# Patient Record
Sex: FEMALE | Race: WHITE | NOT HISPANIC OR LATINO | Employment: OTHER | URBAN - METROPOLITAN AREA
[De-identification: names, ages, dates, MRNs, and addresses within clinical notes are randomized per-mention and may not be internally consistent; named-entity substitution may affect disease eponyms.]

---

## 2023-02-23 LAB
ANION GAP IN SER/PLAS: 8 MMOL/L (ref 10–20)
CALCIUM (MG/DL) IN SER/PLAS: 9.6 MG/DL (ref 8.6–10.3)
CARBON DIOXIDE, TOTAL (MMOL/L) IN SER/PLAS: 26 MMOL/L (ref 21–32)
CHLORIDE (MMOL/L) IN SER/PLAS: 112 MMOL/L (ref 98–107)
CREATININE (MG/DL) IN SER/PLAS: 0.8 MG/DL (ref 0.5–1.05)
GFR FEMALE: 75 ML/MIN/1.73M2
GLUCOSE (MG/DL) IN SER/PLAS: 99 MG/DL (ref 74–99)
POTASSIUM (MMOL/L) IN SER/PLAS: 4.2 MMOL/L (ref 3.5–5.3)
SODIUM (MMOL/L) IN SER/PLAS: 142 MMOL/L (ref 136–145)
UREA NITROGEN (MG/DL) IN SER/PLAS: 18 MG/DL (ref 6–23)

## 2023-03-06 DIAGNOSIS — I50.22 CHRONIC SYSTOLIC HEART FAILURE (MULTI): Primary | ICD-10-CM

## 2023-03-14 ENCOUNTER — TELEMEDICINE (OUTPATIENT)
Dept: PHARMACY | Facility: HOSPITAL | Age: 78
End: 2023-03-14
Payer: MEDICARE

## 2023-03-14 DIAGNOSIS — I50.22 CHRONIC SYSTOLIC HEART FAILURE (MULTI): ICD-10-CM

## 2023-03-14 RX ORDER — ISOSORBIDE MONONITRATE 30 MG/1
30 TABLET, EXTENDED RELEASE ORAL DAILY
COMMUNITY
End: 2023-05-16 | Stop reason: ALTCHOICE

## 2023-03-14 RX ORDER — CITALOPRAM 20 MG/1
1 TABLET, FILM COATED ORAL DAILY
COMMUNITY
End: 2024-04-15 | Stop reason: WASHOUT

## 2023-03-14 RX ORDER — FERROUS SULFATE 325(65) MG
65 TABLET ORAL DAILY
COMMUNITY
End: 2023-11-28 | Stop reason: ALTCHOICE

## 2023-03-14 RX ORDER — PAROXETINE 30 MG/1
1 TABLET, FILM COATED ORAL DAILY
COMMUNITY
End: 2023-11-28 | Stop reason: ALTCHOICE

## 2023-03-14 RX ORDER — OMEPRAZOLE 40 MG/1
40 CAPSULE, DELAYED RELEASE ORAL 2 TIMES DAILY
COMMUNITY
End: 2023-07-06 | Stop reason: SDUPTHER

## 2023-03-14 RX ORDER — PREDNISONE 20 MG/1
20 TABLET ORAL
COMMUNITY
Start: 2023-01-19 | End: 2023-05-16 | Stop reason: ALTCHOICE

## 2023-03-14 RX ORDER — SACUBITRIL AND VALSARTAN 24; 26 MG/1; MG/1
1 TABLET, FILM COATED ORAL 2 TIMES DAILY
COMMUNITY
End: 2024-04-22

## 2023-03-14 RX ORDER — ASPIRIN 81 MG/1
1 TABLET ORAL DAILY
COMMUNITY
Start: 2019-08-14

## 2023-03-14 RX ORDER — DAPAGLIFLOZIN 10 MG/1
1 TABLET, FILM COATED ORAL DAILY
COMMUNITY
Start: 2023-02-24 | End: 2023-10-24

## 2023-03-14 RX ORDER — IPRATROPIUM BROMIDE 42 UG/1
2 SPRAY, METERED NASAL 2 TIMES DAILY
COMMUNITY
End: 2023-11-28 | Stop reason: ALTCHOICE

## 2023-03-14 RX ORDER — EPINEPHRINE 0.3 MG/.3ML
1 INJECTION SUBCUTANEOUS AS NEEDED
COMMUNITY

## 2023-03-14 RX ORDER — ROSUVASTATIN CALCIUM 20 MG/1
20 TABLET, COATED ORAL DAILY
COMMUNITY

## 2023-03-14 RX ORDER — SPIRONOLACTONE 25 MG/1
1 TABLET ORAL DAILY
COMMUNITY
Start: 2023-02-23

## 2023-03-14 RX ORDER — LORATADINE 10 MG/1
1 TABLET ORAL DAILY
COMMUNITY
Start: 2019-08-27 | End: 2023-05-16 | Stop reason: ALTCHOICE

## 2023-03-14 RX ORDER — METOPROLOL SUCCINATE 25 MG/1
25 TABLET, EXTENDED RELEASE ORAL DAILY
COMMUNITY

## 2023-03-14 RX ORDER — MONTELUKAST SODIUM 10 MG/1
10 TABLET ORAL DAILY
COMMUNITY
End: 2023-05-16 | Stop reason: ALTCHOICE

## 2023-03-14 RX ORDER — NITROGLYCERIN 0.4 MG/1
0.4 TABLET SUBLINGUAL EVERY 5 MIN PRN
COMMUNITY
Start: 2020-06-16 | End: 2024-01-15 | Stop reason: SDUPTHER

## 2023-03-14 RX ORDER — DONEPEZIL HYDROCHLORIDE 10 MG/1
10 TABLET, FILM COATED ORAL NIGHTLY
COMMUNITY
End: 2023-05-16 | Stop reason: ENTERED-IN-ERROR

## 2023-03-14 RX ORDER — BUSPIRONE HYDROCHLORIDE 10 MG/1
10 TABLET ORAL 2 TIMES DAILY
COMMUNITY
End: 2023-11-28 | Stop reason: ALTCHOICE

## 2023-03-14 RX ORDER — MEMANTINE HYDROCHLORIDE 5 MG/1
5 TABLET ORAL NIGHTLY
COMMUNITY
End: 2023-05-16

## 2023-03-14 RX ORDER — LAMOTRIGINE 25 MG/1
25 TABLET ORAL 2 TIMES DAILY
COMMUNITY
End: 2023-05-16 | Stop reason: ALTCHOICE

## 2023-03-14 RX ORDER — COLCHICINE 0.6 MG/1
0.6 CAPSULE ORAL DAILY PRN
COMMUNITY
End: 2023-05-16 | Stop reason: ALTCHOICE

## 2023-03-14 RX ORDER — OXYBUTYNIN CHLORIDE 15 MG/1
1 TABLET, EXTENDED RELEASE ORAL 2 TIMES DAILY
COMMUNITY
Start: 2023-01-17 | End: 2023-11-28 | Stop reason: ALTCHOICE

## 2023-03-14 ASSESSMENT — ENCOUNTER SYMPTOMS
EDEMA: 0
PALPITATIONS: 0
SHORTNESS OF BREATH: 0
FATIGUE: 0

## 2023-03-14 NOTE — PROGRESS NOTES
Pharmacy Post-Discharge Follow Up Visit  Gianna Parker is a 77 y.o. female was referred to Clinical Pharmacy Team to complete a post-discharge medication optimization and monitoring visit.  The patient was referred for their Congestive Heart Failure.      Referring Provider: Nando Lombardi MD    Subjective   Allergies   Allergen Reactions    Bee Venom Protein (Honey Bee) Anaphylaxis and Unknown    Fish Containing Products Unknown    Shellfish Containing Products Unknown    Nitrofurantoin Rash     burning    Penicillins Itching, Rash and Unknown     When Young; tolerated ceftriaxone Dec 2022    Sulfa (Sulfonamide Antibiotics) Unknown and Rash     No Pharmacies Listed    Social History     Social History Narrative    Not on file        Congestive Heart Failure  Presents for follow-up visit. Pertinent negatives include no chest pain, edema, fatigue, palpitations or shortness of breath (on exertion). Compliance with total regimen is %. Compliance with medications is %.       Review of Systems   Constitutional:  Negative for fatigue.   Respiratory:  Negative for shortness of breath (on exertion).    Cardiovascular:  Negative for chest pain and palpitations.       Objective     There were no vitals taken for this visit.     LAB  Lab Results   Component Value Date    BILITOT 0.8 01/12/2023    CALCIUM 9.6 02/23/2023    CO2 26 02/23/2023     (H) 02/23/2023    CREATININE 0.80 02/23/2023    GLUCOSE 99 02/23/2023    ALKPHOS 100 01/12/2023    K 4.2 02/23/2023    PROT 7.6 01/12/2023     02/23/2023    AST 13 01/12/2023    ALT 12 01/12/2023    BUN 18 02/23/2023    ANIONGAP 8 (L) 02/23/2023    MG 1.86 12/28/2022    PHOS 1.8 (L) 12/29/2022    ALBUMIN 4.2 01/12/2023    GFRF 75 02/23/2023     Lab Results   Component Value Date    TRIG 161 (H) 12/08/2020    CHOL 106 12/08/2020    HDL 33.0 (A) 12/08/2020     Lab Results   Component Value Date    HGBA1C 6.6 12/20/2019         Current Outpatient Medications  on File Prior to Visit   Medication Sig Dispense Refill    albuterol 90 mcg/actuation aerosol powdr breath activated inhaler Inhale 2 puffs every 4 hours if needed.      aspirin 81 mg EC tablet Take 1 tablet (81 mg) by mouth once daily.      Farxiga 10 mg Take 1 tablet (10 mg) by mouth once daily.      loratadine (Claritin) 10 mg tablet Take 1 tablet (10 mg) by mouth once daily.      nitroglycerin (Nitrostat) 0.4 mg SL tablet Place 1 tablet (0.4 mg) under the tongue every 5 minutes if needed.      oxybutynin XL (Ditropan-XL) 10 mg 24 hr tablet Take 1 tablet (10 mg) by mouth in the morning and 1 tablet (10 mg) before bedtime.      predniSONE (Deltasone) 20 mg tablet Take 1 tablet (20 mg) by mouth. 3 tablets daily for 3 days, then 2 tablets daily for 3 days, then 1 tablet daily for 3 days      spironolactone (Aldactone) 25 mg tablet Take 0.5 tablets (12.5 mg) by mouth once daily.      busPIRone (Buspar) 10 mg tablet Take 1 tablet (10 mg) by mouth in the morning and 1 tablet (10 mg) before bedtime.      citalopram (CeleXA) 20 mg tablet Take 1 tablet (20 mg) by mouth once daily.      colchicine (Mitigare) 0.6 mg capsule capsule Take 1 capsule (0.6 mg) by mouth once daily as needed.      donepezil (Aricept) 10 mg tablet Take 1 tablet (10 mg) by mouth once daily at bedtime.      Entresto 24-26 mg tablet Take 1 tablet by mouth in the morning and 1 tablet before bedtime.      EPINEPHrine 0.3 mg/0.3 mL injection syringe Inject 0.3 mL (0.3 mg) as directed if needed.      ferrous sulfate 325 (65 Fe) MG tablet Take 1 tablet (65 mg of iron) by mouth once daily.      ipratropium (Atrovent) 42 mcg (0.06 %) nasal spray Administer 2 sprays into each nostril in the morning and 2 sprays before bedtime.      isosorbide mononitrate ER (Imdur) 30 mg 24 hr tablet Take 1 tablet (30 mg) by mouth once daily.      lamoTRIgine (LaMICtal) 25 mg tablet Take 1 tablet (25 mg) by mouth in the morning and 1 tablet (25 mg) in the evening.       memantine (Namenda) 5 mg tablet Take 1 tablet (5 mg) by mouth once daily at bedtime.      metoprolol succinate XL (Toprol-XL) 25 mg 24 hr tablet Take 1 tablet (25 mg) by mouth once daily.      montelukast (Singulair) 10 mg tablet Take 1 tablet (10 mg) by mouth once daily.      omeprazole (PriLOSEC) 40 mg DR capsule Take 1 capsule (40 mg) by mouth in the morning and 1 capsule (40 mg) before bedtime.      PARoxetine (Paxil) 30 mg tablet Take 1 tablet (30 mg) by mouth once daily.      rosuvastatin (Crestor) 20 mg tablet Take 1 tablet (20 mg) by mouth once daily.       No current facility-administered medications on file prior to visit.      CHRONIC HEART FAILURE/ CAD with stent MEDICATION ASSESSMENT    CURRENT PHARMACOTHERAPY  - Entresto 24-26m tablet by mouth twice daily (expensive)  - Metoprolol succinate ER 25m tablet by mouth daily   - Aspirin 81m tablet by mouth daily   - Isosorbide Mononitrate ER 30m tablet by mouth daily  - Nitroglycerin 0.4mg SL: 1 tablet as needed   -Spironolactone 25mg : 0.5 tablet daily     Ejection Fraction [2022]: [35-40] %    Guideline-Directed Medical Therapy:  -ARNI: Yes, describe: [id number] tablet twice daily    -If no, then ACEi/ARB?: No  -Beta Blocker: Yes, describe: Metoprolol succinate XL 25mg tablet daily   -MRA: Yes, describe: Spironolactone 25mg tablet: 0.5 tab daily   -SGLT2i: No    Secondary Prevention:  -The ASCVD Risk score (Jose DK, et al., 2019) failed to calculate for the following reasons:    The valid total cholesterol range is 130 to 320 mg/dL    The smoking status is invalid   -Aspirin 81mg? yes  -Statin?: Yes, describe: Rosuvastatin 20mg daily  -HTN?: Yes, describe: Entresto and Spironolactone       HISTORICAL PHARMACOTHERAPY  - Brilinta 90m tablet by mouth twice a day (discontinued  expensive)    DRUG INTERATIONS  - None    PATIENT EDUCATION/DISCUSSION:  -Patient was initiated on Spironolactone 25mg 0.5 tab daily 2 weeks ago.  Patient states tolerating well. Denies symptoms of hypo/hypertension. Patient denies swelling in the arms/legs, chest pain, and only has SOB on exertion. Recommend patient to start logging blood pressures at home with her cuff, so she can bring to her cardio appointments 2 weeks ago. Patient states she has not been recording BP log. Patient states her  checks his every morning, so suggested to start taking it in the morning together.   - Depending on blood pressure readings at next visit, consider increasing Spironolactone 25mg to 1 tablet daily. Patient in agreement.  -Last visit, I ran test claim for Farxgia ($15 copay with  Pharmacy). Jardiance had $113 copay. I tasked cardiologist with  SGLT2 recommendation.      Assessment/Plan   Problem List Items Addressed This Visit    None  Visit Diagnoses       Chronic systolic heart failure (CMS/HCC)        Relevant Medications    metoprolol succinate XL (Toprol-XL) 25 mg 24 hr tablet    nitroglycerin (Nitrostat) 0.4 mg SL tablet    Entresto 24-26 mg tablet    EPINEPHrine 0.3 mg/0.3 mL injection syringe    isosorbide mononitrate ER (Imdur) 30 mg 24 hr tablet        Continue Spironolactone 25mg 0.5 tablet by mouth daily  Continue all other medications as prescribed   Will follow up in 4-6 weeks or as needed    Shirley Wong PharmD.   Meds PGY1 Pharmacy Resident  Phone: [phone]      Verbal consent to manage patient's drug therapy was obtained from the patient. They were informed they may decline to participate or withdraw from participation in pharmacy services at any time.

## 2023-03-16 NOTE — PROGRESS NOTES
I reviewed the progress note and agree with the resident’s findings and plans as written. Case discussed with resident.    Serge Crane, PharmD

## 2023-05-01 ENCOUNTER — TELEPHONE (OUTPATIENT)
Dept: PHARMACY | Facility: HOSPITAL | Age: 78
End: 2023-05-01
Payer: MEDICARE

## 2023-05-01 DIAGNOSIS — I50.22 CHRONIC SYSTOLIC HEART FAILURE (MULTI): Primary | ICD-10-CM

## 2023-05-01 NOTE — TELEPHONE ENCOUNTER
[facility]  Patient was referred to the Clinical Pharmacy Team for CHF/CAD stent  Patient returned phone call - scheduled appointment for 5/1/23

## 2023-05-16 ENCOUNTER — OFFICE VISIT (OUTPATIENT)
Dept: PRIMARY CARE | Facility: CLINIC | Age: 78
End: 2023-05-16
Payer: MEDICARE

## 2023-05-16 VITALS
BODY MASS INDEX: 24.32 KG/M2 | OXYGEN SATURATION: 96 % | DIASTOLIC BLOOD PRESSURE: 64 MMHG | RESPIRATION RATE: 14 BRPM | HEIGHT: 65 IN | WEIGHT: 146 LBS | HEART RATE: 91 BPM | SYSTOLIC BLOOD PRESSURE: 102 MMHG

## 2023-05-16 DIAGNOSIS — I50.20 HFREF (HEART FAILURE WITH REDUCED EJECTION FRACTION) (MULTI): Primary | ICD-10-CM

## 2023-05-16 DIAGNOSIS — I25.119 CORONARY ARTERY DISEASE INVOLVING NATIVE CORONARY ARTERY OF NATIVE HEART WITH ANGINA PECTORIS (CMS-HCC): ICD-10-CM

## 2023-05-16 DIAGNOSIS — G31.84 MCI (MILD COGNITIVE IMPAIRMENT): Primary | ICD-10-CM

## 2023-05-16 DIAGNOSIS — N90.89 BURNING SENSATION OF FEMALE PERINEUM: ICD-10-CM

## 2023-05-16 DIAGNOSIS — F32.A DEPRESSION, UNSPECIFIED DEPRESSION TYPE: ICD-10-CM

## 2023-05-16 PROCEDURE — 1157F ADVNC CARE PLAN IN RCRD: CPT | Performed by: INTERNAL MEDICINE

## 2023-05-16 PROCEDURE — 1159F MED LIST DOCD IN RCRD: CPT | Performed by: INTERNAL MEDICINE

## 2023-05-16 PROCEDURE — 1036F TOBACCO NON-USER: CPT | Performed by: INTERNAL MEDICINE

## 2023-05-16 PROCEDURE — 99213 OFFICE O/P EST LOW 20 MIN: CPT | Performed by: INTERNAL MEDICINE

## 2023-05-16 RX ORDER — MEMANTINE HYDROCHLORIDE 5 MG/1
TABLET ORAL
Qty: 90 TABLET | Refills: 1 | Status: SHIPPED | OUTPATIENT
Start: 2023-05-16 | End: 2023-05-16 | Stop reason: ENTERED-IN-ERROR

## 2023-05-16 ASSESSMENT — ENCOUNTER SYMPTOMS
DIARRHEA: 0
FEVER: 0
JOINT SWELLING: 0
COUGH: 0
DIAPHORESIS: 0
SHORTNESS OF BREATH: 0
NAUSEA: 0
CONSTIPATION: 0
CHILLS: 0
MYALGIAS: 0
VOMITING: 0

## 2023-05-16 ASSESSMENT — PATIENT HEALTH QUESTIONNAIRE - PHQ9
1. LITTLE INTEREST OR PLEASURE IN DOING THINGS: NOT AT ALL
2. FEELING DOWN, DEPRESSED OR HOPELESS: NOT AT ALL
SUM OF ALL RESPONSES TO PHQ9 QUESTIONS 1 AND 2: 0

## 2023-05-16 NOTE — PROGRESS NOTES
"Subjective   Gianna Parker is a 77 y.o. female who presents for FOLLOW UP    HAVING A DEVICE PLACED TO STIMULATE BP     HPI   WAS IN HOSPITAL 12/23 BULGE NEAR PAROTID    WAS TOLD FROM DRY MOUTH AND A STONE?    WAS GIVEN STEROIDS AND PAIN MEDS, AND ABX    GOT BETTER.    DR. BEAR, DR. WILKINS, AND EP DOCTOR    PLANNING DEVICE PLACEMENT FOR CARDIAC CONTRACTILITY MODULATION?    HAPPENING IN 4-6 WEEKS    HAVING A PERINEAL ITCH/BURN. TRIED VASELINE/CREAMS.      Review of Systems   Constitutional:  Negative for chills, diaphoresis and fever.   Respiratory:  Negative for cough and shortness of breath.    Cardiovascular:  Negative for chest pain and leg swelling.   Gastrointestinal:  Negative for constipation, diarrhea, nausea and vomiting.   Musculoskeletal:  Negative for joint swelling and myalgias.       Objective   /64   Pulse 91   Resp 14   Ht 1.651 m (5' 5\")   Wt 66.2 kg (146 lb)   SpO2 96%   BMI 24.30 kg/m²     Physical Exam  Vitals reviewed.   Constitutional:       General: She is not in acute distress.     Appearance: She is not ill-appearing.   Cardiovascular:      Rate and Rhythm: Normal rate and regular rhythm.      Pulses: Normal pulses.      Heart sounds:      No gallop.   Pulmonary:      Breath sounds: Normal breath sounds. No wheezing, rhonchi or rales.   Abdominal:      General: Abdomen is flat. Bowel sounds are normal.      Palpations: Abdomen is soft.      Tenderness: There is no guarding or rebound.   Musculoskeletal:      Right lower leg: No edema.      Left lower leg: No edema.         Assessment/Plan   Problem List Items Addressed This Visit          Nervous    Burning sensation of female perineum       Circulatory    HFrEF (heart failure with reduced ejection fraction) (CMS/HCC) - Primary    Coronary artery disease involving native coronary artery of native heart with angina pectoris (CMS/HCC)       Other    Depression     Patient Instructions    LET CARDIOLOGIST KNOW ABOUT THE " PERINEAL IRRITATION, THIS CAN BE FROM FARXIGA.  YOU MAY USE GYNE-LOTRIMIN OVER THE COUNTER AS NEEDED TO CONTROL THE ITCHING    2.  PLEASE CALL IF REFILLS ARE NEEDED    3.  I WILL AWAIT NEWS ABOUT YOUR DEVICE PLACEMENT    4.  FOLLOW UP 6 MONTHS OR AS NEEDED.

## 2023-05-16 NOTE — PATIENT INSTRUCTIONS
LET CARDIOLOGIST KNOW ABOUT THE PERINEAL IRRITATION, THIS CAN BE FROM FARXIGA.  YOU MAY USE GYNE-LOTRIMIN OVER THE COUNTER AS NEEDED TO CONTROL THE ITCHING    2.  PLEASE CALL IF REFILLS ARE NEEDED    3.  I WILL AWAIT NEWS ABOUT YOUR DEVICE PLACEMENT    4.  FOLLOW UP 6 MONTHS OR AS NEEDED.

## 2023-06-26 ENCOUNTER — TELEPHONE (OUTPATIENT)
Dept: PHARMACY | Facility: HOSPITAL | Age: 78
End: 2023-06-26
Payer: MEDICARE

## 2023-06-26 DIAGNOSIS — J30.9 ALLERGIC RHINITIS, UNSPECIFIED SEASONALITY, UNSPECIFIED TRIGGER: Primary | ICD-10-CM

## 2023-06-26 RX ORDER — LORATADINE 10 MG/1
10 TABLET ORAL DAILY
COMMUNITY
End: 2023-06-26 | Stop reason: SDUPTHER

## 2023-06-26 RX ORDER — LORATADINE 10 MG/1
10 TABLET ORAL DAILY
Qty: 90 TABLET | Refills: 3 | Status: SHIPPED | OUTPATIENT
Start: 2023-06-26 | End: 2023-11-28 | Stop reason: ALTCHOICE

## 2023-06-26 NOTE — TELEPHONE ENCOUNTER
Population Health: Outreach by Ambulatory Pharmacy Team    Payor: Stephanie HAWK  Reason: Adherence  Medication: rosuvastatin 20 mg   Outcome:  patient states she is taking it.     aMriely Rivero, PharmD

## 2023-06-29 DIAGNOSIS — J45.909 ASTHMA, UNSPECIFIED ASTHMA SEVERITY, UNSPECIFIED WHETHER COMPLICATED, UNSPECIFIED WHETHER PERSISTENT (HHS-HCC): Primary | ICD-10-CM

## 2023-07-03 RX ORDER — MONTELUKAST SODIUM 10 MG/1
10 TABLET ORAL DAILY
Qty: 90 TABLET | Refills: 3 | Status: SHIPPED | OUTPATIENT
Start: 2023-07-03 | End: 2024-05-06

## 2023-07-06 DIAGNOSIS — K21.9 GASTROESOPHAGEAL REFLUX DISEASE, UNSPECIFIED WHETHER ESOPHAGITIS PRESENT: Primary | ICD-10-CM

## 2023-07-06 RX ORDER — OMEPRAZOLE 40 MG/1
40 CAPSULE, DELAYED RELEASE ORAL 2 TIMES DAILY
Qty: 180 CAPSULE | Refills: 3 | Status: SHIPPED | OUTPATIENT
Start: 2023-07-06 | End: 2024-05-06

## 2023-08-15 ENCOUNTER — TELEPHONE (OUTPATIENT)
Dept: PRIMARY CARE | Facility: CLINIC | Age: 78
End: 2023-08-15
Payer: MEDICARE

## 2023-08-15 DIAGNOSIS — B37.9 CANDIDIASIS: Primary | ICD-10-CM

## 2023-08-15 RX ORDER — FLUCONAZOLE 150 MG/1
150 TABLET ORAL WEEKLY
Qty: 2 TABLET | Refills: 0 | Status: SHIPPED | OUTPATIENT
Start: 2023-08-15 | End: 2023-08-29

## 2023-09-04 PROBLEM — I83.893 VARICOSE VEINS OF BILATERAL LOWER EXTREMITIES WITH OTHER COMPLICATIONS: Status: ACTIVE | Noted: 2023-09-04

## 2023-09-04 PROBLEM — I10 BENIGN ESSENTIAL HYPERTENSION: Status: ACTIVE | Noted: 2023-09-04

## 2023-09-04 PROBLEM — K21.9 GASTROESOPHAGEAL REFLUX DISEASE: Status: ACTIVE | Noted: 2023-09-04

## 2023-09-04 PROBLEM — R09.89 RIGHT CAROTID BRUIT: Status: ACTIVE | Noted: 2023-09-04

## 2023-09-04 PROBLEM — E78.2 MIXED HYPERLIPIDEMIA: Status: ACTIVE | Noted: 2023-09-04

## 2023-09-04 PROBLEM — K11.20 PAROTIDITIS: Status: ACTIVE | Noted: 2023-09-04

## 2023-09-04 PROBLEM — I50.20 SYSTOLIC HEART FAILURE, ACC/AHA STAGE C (MULTI): Status: ACTIVE | Noted: 2023-09-04

## 2023-09-04 PROBLEM — K86.1 CHRONIC RECURRENT PANCREATITIS (MULTI): Status: ACTIVE | Noted: 2023-09-04

## 2023-09-04 PROBLEM — K83.8 CHOLANGIECTASIS: Status: ACTIVE | Noted: 2023-09-04

## 2023-09-04 PROBLEM — I20.0 UNSTABLE ANGINA (MULTI): Status: ACTIVE | Noted: 2023-09-04

## 2023-09-04 PROBLEM — N39.46 URGE AND STRESS INCONTINENCE: Status: ACTIVE | Noted: 2023-09-04

## 2023-09-04 PROBLEM — I50.22 CHRONIC SYSTOLIC HEART FAILURE (MULTI): Status: ACTIVE | Noted: 2023-09-04

## 2023-09-04 PROBLEM — M19.072 ARTHRITIS OF MIDTARSAL JOINT OF LEFT FOOT: Status: ACTIVE | Noted: 2023-09-04

## 2023-09-04 PROBLEM — R05.3 CHRONIC COUGH: Status: ACTIVE | Noted: 2023-09-04

## 2023-09-04 PROBLEM — I10 HTN (HYPERTENSION): Status: ACTIVE | Noted: 2023-09-04

## 2023-09-04 PROBLEM — J45.909 ASTHMATIC BRONCHITIS (HHS-HCC): Status: ACTIVE | Noted: 2023-09-04

## 2023-09-04 PROBLEM — I87.2 PERIPHERAL VENOUS INSUFFICIENCY: Status: ACTIVE | Noted: 2023-09-04

## 2023-09-04 PROBLEM — R94.31 NONSPECIFIC ABNORMAL ELECTROCARDIOGRAM (ECG) (EKG): Status: ACTIVE | Noted: 2023-09-04

## 2023-09-04 PROBLEM — K83.8 DILATED BILE DUCT: Status: ACTIVE | Noted: 2023-09-04

## 2023-09-04 PROBLEM — F32.A ANXIETY AND DEPRESSION: Status: ACTIVE | Noted: 2023-09-04

## 2023-09-04 PROBLEM — I83.12 VARICOSE VEINS OF LEFT LOWER EXTREMITY WITH INFLAMMATION: Status: ACTIVE | Noted: 2023-09-04

## 2023-09-04 PROBLEM — I87.8 POOR VENOUS ACCESS: Status: ACTIVE | Noted: 2023-09-04

## 2023-09-04 PROBLEM — E61.1 IRON DEFICIENCY: Status: ACTIVE | Noted: 2023-09-04

## 2023-09-04 PROBLEM — B37.0 THRUSH: Status: ACTIVE | Noted: 2023-09-04

## 2023-09-04 PROBLEM — I82.409 DEEP VENOUS THROMBOSIS (MULTI): Status: ACTIVE | Noted: 2023-09-04

## 2023-09-04 PROBLEM — D64.9 ANEMIA: Status: ACTIVE | Noted: 2023-09-04

## 2023-09-04 PROBLEM — R22.1 LUMP IN NECK: Status: ACTIVE | Noted: 2023-09-04

## 2023-09-04 PROBLEM — M10.9 GOUT: Status: ACTIVE | Noted: 2023-09-04

## 2023-09-04 PROBLEM — M54.16 LUMBAR RADICULOPATHY, RIGHT: Status: ACTIVE | Noted: 2023-09-04
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
## 2023-09-04 PROBLEM — M19.019 OSTEOARTHRITIS OF SHOULDER: Status: ACTIVE | Noted: 2023-09-04

## 2023-09-04 PROBLEM — R07.9 CHEST PAIN: Status: ACTIVE | Noted: 2022-09-20

## 2023-09-04 PROBLEM — E78.00 HYPERCHOLESTEROLEMIA: Status: ACTIVE | Noted: 2023-09-04

## 2023-09-04 PROBLEM — M75.100 ROTATOR CUFF TEAR: Status: ACTIVE | Noted: 2023-09-04

## 2023-09-04 PROBLEM — R39.15 URGENCY OF MICTURITION: Status: ACTIVE | Noted: 2023-09-04

## 2023-09-04 PROBLEM — E55.9 MILD VITAMIN D DEFICIENCY: Status: ACTIVE | Noted: 2023-09-04

## 2023-09-04 PROBLEM — K86.1 CHRONIC PANCREATITIS (MULTI): Status: ACTIVE | Noted: 2023-09-04

## 2023-09-04 PROBLEM — M54.30 SCIATICA: Status: ACTIVE | Noted: 2023-09-04

## 2023-09-04 PROBLEM — K14.0 GLOSSITIS: Status: ACTIVE | Noted: 2023-09-04

## 2023-09-04 PROBLEM — F41.9 ANXIETY AND DEPRESSION: Status: ACTIVE | Noted: 2023-09-04

## 2023-09-04 PROBLEM — M15.9 GENERALIZED OSTEOARTHRITIS: Status: ACTIVE | Noted: 2023-09-04

## 2023-09-04 PROBLEM — Z98.890 STATUS POST ENDOVENOUS RADIOFREQUENCY ABLATION (RFA) OF SAPHENOUS VEIN: Status: ACTIVE | Noted: 2023-03-28

## 2023-09-04 PROBLEM — R39.15 URINARY URGENCY: Status: ACTIVE | Noted: 2023-09-04

## 2023-09-04 PROBLEM — J06.9 RECURRENT URI (UPPER RESPIRATORY INFECTION): Status: ACTIVE | Noted: 2023-09-04

## 2023-09-04 PROBLEM — M19.91 PRIMARY LOCALIZED OSTEOARTHROSIS OF MULTIPLE SITES: Status: ACTIVE | Noted: 2023-09-04

## 2023-09-04 PROBLEM — I25.5 ISCHEMIC CARDIOMYOPATHY: Status: ACTIVE | Noted: 2023-09-04

## 2023-09-04 PROBLEM — Z98.890 STATUS POST PHLEBECTOMY: Status: ACTIVE | Noted: 2023-09-04

## 2023-09-04 RX ORDER — NITROGLYCERIN 0.3 MG/1
1 TABLET SUBLINGUAL EVERY 5 MIN PRN
COMMUNITY

## 2023-09-04 RX ORDER — MEMANTINE HYDROCHLORIDE 10 MG/1
1 TABLET ORAL 2 TIMES DAILY
COMMUNITY
Start: 2023-05-29 | End: 2023-11-28 | Stop reason: ALTCHOICE

## 2023-09-04 RX ORDER — TICAGRELOR 90 MG/1
1 TABLET ORAL 2 TIMES DAILY
COMMUNITY
Start: 2022-10-06 | End: 2023-10-10 | Stop reason: ALTCHOICE

## 2023-09-04 RX ORDER — COLCHICINE 0.6 MG/1
1 TABLET ORAL DAILY
COMMUNITY
Start: 2023-02-14 | End: 2023-11-28 | Stop reason: ALTCHOICE

## 2023-09-04 RX ORDER — HYDROCODONE BITARTRATE AND ACETAMINOPHEN 5; 325 MG/1; MG/1
1 TABLET ORAL
COMMUNITY
End: 2024-01-30 | Stop reason: ALTCHOICE

## 2023-09-04 RX ORDER — DONEPEZIL HYDROCHLORIDE 10 MG/1
1 TABLET, FILM COATED ORAL NIGHTLY
COMMUNITY
Start: 2022-12-30 | End: 2023-11-28 | Stop reason: ALTCHOICE

## 2023-09-04 RX ORDER — PAROXETINE HYDROCHLORIDE HEMIHYDRATE 25 MG/1
25 TABLET, FILM COATED, EXTENDED RELEASE ORAL
COMMUNITY
End: 2024-05-31 | Stop reason: WASHOUT

## 2023-09-04 RX ORDER — DOXEPIN HYDROCHLORIDE 25 MG/1
1 CAPSULE ORAL DAILY
COMMUNITY
End: 2024-04-15 | Stop reason: WASHOUT

## 2023-09-04 RX ORDER — BETAMETHASONE DIPROPIONATE 0.5 MG/G
CREAM TOPICAL 2 TIMES DAILY
COMMUNITY
Start: 2022-08-08 | End: 2023-11-28 | Stop reason: ALTCHOICE

## 2023-09-04 RX ORDER — ALBUTEROL SULFATE 90 UG/1
2 AEROSOL, METERED RESPIRATORY (INHALATION) 4 TIMES DAILY PRN
COMMUNITY
End: 2023-11-28 | Stop reason: ALTCHOICE

## 2023-09-04 RX ORDER — METOPROLOL SUCCINATE 25 MG/1
2 TABLET, EXTENDED RELEASE ORAL
COMMUNITY
Start: 2022-09-21 | End: 2023-10-10 | Stop reason: ALTCHOICE

## 2023-09-04 RX ORDER — HYDROCODONE BITARTRATE AND ACETAMINOPHEN 7.5; 325 MG/1; MG/1
TABLET ORAL
COMMUNITY
Start: 2023-05-05 | End: 2023-11-28 | Stop reason: ALTCHOICE

## 2023-09-04 RX ORDER — LAMOTRIGINE 25 MG/1
3 TABLET ORAL 2 TIMES DAILY
COMMUNITY
End: 2023-11-28 | Stop reason: ALTCHOICE

## 2023-09-04 RX ORDER — OXYCODONE HYDROCHLORIDE 5 MG/1
TABLET ORAL
COMMUNITY
Start: 2022-12-29 | End: 2023-11-28 | Stop reason: ALTCHOICE

## 2023-09-04 RX ORDER — OMEPRAZOLE 20 MG/1
1 CAPSULE, DELAYED RELEASE ORAL 2 TIMES DAILY
COMMUNITY
Start: 2002-05-30 | End: 2023-11-28 | Stop reason: ALTCHOICE

## 2023-09-04 RX ORDER — DONEPEZIL HYDROCHLORIDE 5 MG/1
1 TABLET, FILM COATED ORAL NIGHTLY
COMMUNITY
End: 2023-11-28 | Stop reason: ALTCHOICE

## 2023-09-04 RX ORDER — METOPROLOL SUCCINATE 50 MG/1
1 TABLET, EXTENDED RELEASE ORAL DAILY
COMMUNITY
Start: 2022-07-12 | End: 2023-10-10 | Stop reason: ALTCHOICE

## 2023-09-04 RX ORDER — METOPROLOL TARTRATE 25 MG/1
1 TABLET, FILM COATED ORAL 2 TIMES DAILY
COMMUNITY
Start: 2018-05-01 | End: 2023-10-10 | Stop reason: ALTCHOICE

## 2023-09-04 RX ORDER — DOXYCYCLINE 100 MG/1
100 TABLET ORAL DAILY
COMMUNITY
Start: 2022-12-29 | End: 2024-01-30 | Stop reason: ALTCHOICE

## 2023-09-04 RX ORDER — IBUPROFEN 600 MG/1
1 TABLET ORAL 3 TIMES DAILY
COMMUNITY

## 2023-09-04 RX ORDER — OXYBUTYNIN CHLORIDE 15 MG/1
1 TABLET, EXTENDED RELEASE ORAL DAILY
COMMUNITY
Start: 2022-11-22 | End: 2023-09-08

## 2023-09-04 RX ORDER — MIRTAZAPINE 15 MG/1
1 TABLET, FILM COATED ORAL NIGHTLY
COMMUNITY

## 2023-09-04 RX ORDER — ISOSORBIDE MONONITRATE 30 MG/1
1 TABLET, EXTENDED RELEASE ORAL DAILY
COMMUNITY
End: 2023-11-28 | Stop reason: ALTCHOICE

## 2023-09-04 RX ORDER — NYSTATIN 100000 [USP'U]/ML
4 SUSPENSION ORAL EVERY 6 HOURS
COMMUNITY
Start: 2023-01-03 | End: 2024-05-06 | Stop reason: SDUPTHER

## 2023-09-04 RX ORDER — MEMANTINE HYDROCHLORIDE 5 MG/1
1 TABLET ORAL NIGHTLY
COMMUNITY
Start: 2022-12-30 | End: 2023-11-28 | Stop reason: ALTCHOICE

## 2023-09-08 DIAGNOSIS — R39.15 URGENCY OF URINATION: ICD-10-CM

## 2023-09-08 RX ORDER — OXYBUTYNIN CHLORIDE 15 MG/1
15 TABLET, EXTENDED RELEASE ORAL DAILY
Qty: 90 TABLET | Refills: 3 | Status: SHIPPED | OUTPATIENT
Start: 2023-09-08 | End: 2024-01-15 | Stop reason: SDUPTHER

## 2023-10-10 ENCOUNTER — OFFICE VISIT (OUTPATIENT)
Dept: CARDIOLOGY | Facility: CLINIC | Age: 78
End: 2023-10-10
Payer: MEDICARE

## 2023-10-10 ENCOUNTER — DOCUMENTATION (OUTPATIENT)
Dept: CARDIOLOGY | Facility: HOSPITAL | Age: 78
End: 2023-10-10

## 2023-10-10 VITALS
HEART RATE: 89 BPM | BODY MASS INDEX: 25.07 KG/M2 | SYSTOLIC BLOOD PRESSURE: 165 MMHG | WEIGHT: 156 LBS | DIASTOLIC BLOOD PRESSURE: 74 MMHG | OXYGEN SATURATION: 98 % | HEIGHT: 66 IN

## 2023-10-10 DIAGNOSIS — I25.5 ISCHEMIC CARDIOMYOPATHY: ICD-10-CM

## 2023-10-10 DIAGNOSIS — I50.20 HFREF (HEART FAILURE WITH REDUCED EJECTION FRACTION) (MULTI): Primary | ICD-10-CM

## 2023-10-10 DIAGNOSIS — I50.20 SYSTOLIC HEART FAILURE, ACC/AHA STAGE C (MULTI): Primary | ICD-10-CM

## 2023-10-10 PROCEDURE — 3077F SYST BP >= 140 MM HG: CPT | Performed by: INTERNAL MEDICINE

## 2023-10-10 PROCEDURE — 1159F MED LIST DOCD IN RCRD: CPT | Performed by: INTERNAL MEDICINE

## 2023-10-10 PROCEDURE — 1125F AMNT PAIN NOTED PAIN PRSNT: CPT | Performed by: INTERNAL MEDICINE

## 2023-10-10 PROCEDURE — 1036F TOBACCO NON-USER: CPT | Performed by: INTERNAL MEDICINE

## 2023-10-10 PROCEDURE — 99214 OFFICE O/P EST MOD 30 MIN: CPT | Performed by: INTERNAL MEDICINE

## 2023-10-10 PROCEDURE — 3078F DIAST BP <80 MM HG: CPT | Performed by: INTERNAL MEDICINE

## 2023-10-10 ASSESSMENT — ENCOUNTER SYMPTOMS
LOSS OF SENSATION IN FEET: 1
OCCASIONAL FEELINGS OF UNSTEADINESS: 1
DEPRESSION: 0

## 2023-10-10 NOTE — PATIENT INSTRUCTIONS
To reach Dr. Leon' office please call [phone] ([place]). Fax [phone]. Call [phone] to schedule an appointment. You may also contact the HF RNs at [email or web address] or [phone] (option 6).     1) Continue your current medications  2) I will reach out to you company re: CCM  3) Follow up in 3 months at /[facility]

## 2023-10-10 NOTE — PROGRESS NOTES
History Of Present Illness:    Referring cardiologist: Saba (/[facility])    Ms. Parker is a 78F with a PMHx sig for CAD s/p PCI (LAD; , [place]; 2022) and stage C systolic HF/ICM/HFrEF with moderate LV dysfunction who returns to the Advanced Heart Failure clinic for ongoing evaluation and management.      Interval hx:   Patient states she has been getting increasingly dizzy and short of breath. CCM was denied by her insurance, but she is awaiting an appeal.     Hospitalizations: Admitted -2023 for parotiditis       PM/SHx:   CAD s/p PCI (LAD; , [place]; 2022), stage C systolic HF/ICM/HFrEF with moderate LV dysfunction     SocHx:   Lives in [place]   Denies smoking, ETOH, illicits    FamHx:       Father had  of cardiac arrest at the age of 62, Mother was 97 when she passed but           had heart disease.         Allergies:  Bee venom protein (honey bee), Sulfa (sulfonamide antibiotics), Bacitracin, Clindamycin, Fish containing products, Nitrofurantoin macrocrystal, Oxycodone, Shellfish containing products, Tetracycline, Nitrofurantoin, Penicillins, and Promethazine    Outpatient Medications:  Current Outpatient Medications   Medication Instructions    albuterol 90 mcg/actuation aerosol powdr breath activated inhaler 2 puffs, inhalation, Every 4 hours PRN    albuterol 90 mcg/actuation inhaler 2 puffs, inhalation, 4 times daily PRN    aspirin 81 mg EC tablet 1 tablet, oral, Daily    betamethasone, augmented, (Diprolene AF) 0.05 % cream Topical, 2 times daily,  APPLY AND RUB IN A THIN FILM TO AFFECTED AREAS     Brilinta 90 mg tablet 1 tablet, oral, 2 times daily    busPIRone (BUSPAR) 10 mg, oral, 2 times daily    citalopram (CeleXA) 20 mg tablet 1 tablet, oral, Daily    colchicine, gout, 0.6 mg tablet 1 tablet, oral, Daily    donepezil (Aricept) 10 mg tablet 1 tablet, oral, Nightly    donepezil (Aricept) 5 mg tablet 1 tablet, oral, Nightly    doxepin (SINEquan) 25 mg capsule 1 capsule, oral,  Daily    doxycycline (Adoxa) 100 mg tablet     Entresto 24-26 mg tablet 1 tablet, oral, 2 times daily    EPINEPHrine 0.3 mg/0.3 mL injection syringe 1 Syringe, injection, As needed    Farxiga 10 mg 1 tablet, oral, Daily    ferrous sulfate 325 (65 Fe) MG tablet 65 mg of iron, oral, Daily    HYDROcodone-acetaminophen (Norco) 5-325 mg tablet 1 tablet, oral, EVERY 4 TO 6 HOURS FOR PAIN    HYDROcodone-acetaminophen (Norco) 7.5-325 mg tablet     ibuprofen 600 mg tablet 1 tablet, oral, 3 times daily    ipratropium (Atrovent) 42 mcg (0.06 %) nasal spray 2 sprays, Each Nostril, 2 times daily    isosorbide mononitrate ER (Imdur) 30 mg 24 hr tablet 1 tablet, oral, Daily    lamoTRIgine (LaMICtal) 25 mg tablet 3 tablets, oral, 2 times daily    loratadine (CLARITIN) 10 mg, oral, Daily    memantine (Namenda) 10 mg tablet 1 tablet, oral, 2 times daily    memantine (Namenda) 5 mg tablet 1 tablet, oral, Nightly    metoprolol succinate XL (Toprol-XL) 25 mg 24 hr tablet 2 tablets, oral, Daily RT    metoprolol succinate XL (Toprol-XL) 50 mg 24 hr tablet 1 tablet, oral, Daily    metoprolol succinate XL (TOPROL-XL) 25 mg, oral, Daily    metoprolol tartrate (Lopressor) 25 mg tablet 1 tablet, oral, 2 times daily    mirtazapine (Remeron) 15 mg tablet 1 tablet, oral, Nightly    montelukast (SINGULAIR) 10 mg, oral, Daily    nitroglycerin (Nitrostat) 0.3 mg SL tablet 1 tablet, sublingual, Every 5 min PRN    nitroglycerin (NITROSTAT) 0.4 mg, sublingual, Every 5 min PRN    nystatin (Mycostatin) 100,000 unit/mL suspension 4 mL, oral, Every 6 hours, For oral thrush    omeprazole (PriLOSEC) 20 mg DR capsule 1 capsule, oral, 2 times daily    omeprazole (PriLOSEC) 40 mg DR capsule 1 capsule, Every 24 hours    omeprazole (PRILOSEC) 40 mg, oral, 2 times daily    oxybutynin XL (Ditropan-XL) 15 mg 24 hr tablet 1 tablet, oral, 2 times daily    oxybutynin XL (DITROPAN-XL) 15 mg, oral, Daily    oxyCODONE (Roxicodone) 5 mg immediate release tablet      PARoxetine (Paxil) 30 mg tablet 1 tablet, oral, Daily    PARoxetine CR (PAXIL-CR) 25 mg, oral, Daily RT    rosuvastatin (CRESTOR) 20 mg, oral, Daily    spironolactone (Aldactone) 25 mg tablet 0.5 tablets, oral, Daily    ticagrelor (BRILINTA) 60 mg, oral, 2 times daily       Physical Exam:  On exam Ms. Parker appears her stated age, is alert and oriented x3, and in no acute distress. Her sclera are anicteric and her oropharynx has moist mucous membranes. Her neck is supple and without thyromegaly. The JVP is ~8 cm of water above the right atrium. Her cardiac exam has regular rhythm, normal S1, S2. No S3/4. There are no murmurs. Her lungs are clear to auscultation bilaterally and there is no dullness to percussion. Her abdomen is soft, nontender with normoactive bowel sounds. There is no HJR. The extremities are warm and without edema. The skin is dry. There is no rash present. The distal pulses are 2+ in all four extremities. Her mood and affect are appropriate for todays encounter.        Last Labs:  CBC -  Lab Results   Component Value Date    WBC 18.2 (H) 12/29/2022    HGB 8.4 (L) 12/29/2022    HCT 27.3 (L) 12/29/2022    MCV 82 12/29/2022     12/29/2022       CMP -  Lab Results   Component Value Date    CALCIUM 9.6 02/23/2023    PHOS 1.8 (L) 12/29/2022    PROT 7.6 01/12/2023    ALBUMIN 4.2 01/12/2023    AST 13 01/12/2023    ALT 12 01/12/2023    ALKPHOS 100 01/12/2023    BILITOT 0.8 01/12/2023       LIPID PANEL -   Lab Results   Component Value Date    CHOL 106 12/08/2020    TRIG 161 (H) 12/08/2020    HDL 33.0 (A) 12/08/2020    CHHDL 3.2 12/08/2020    LDLF 41 12/08/2020    VLDL 32 12/08/2020       RENAL FUNCTION PANEL -   Lab Results   Component Value Date    GLUCOSE 99 02/23/2023     02/23/2023    K 4.2 02/23/2023     (H) 02/23/2023    CO2 26 02/23/2023    ANIONGAP 8 (L) 02/23/2023    BUN 18 02/23/2023    CREATININE 0.80 02/23/2023    CALCIUM 9.6 02/23/2023    PHOS 1.8 (L) 12/29/2022    ALBUMIN  4.2 01/12/2023        Lab Results   Component Value Date     (H) 03/16/2020    HGBA1C 6.6 12/20/2019       Last Cardiology Tests:  ECG (4/18/23):  Sinus rhythm (HR 78), LAD    Echo (12/19/22):  1. Left ventricular systolic function is mildly to moderately decreased with a 35-40% estimated ejection fraction.  2. Entire apex is abnormal.  3. Overall mild to moderate regional LV systolic dysfunction with an apical wall motion abnormality. Would have benefitted from use of echocontrast to exclude LV apical thrombus given apical akinesis, however unable to obtain iv access.  4. Spectral Doppler shows an impaired relaxation pattern of left ventricular diastolic filling.  5. There is moderate mitral annular calcification.  6. RVSP within normal limits.  7. No prior echocardiogram available for comparison.    Cardiac cath (9/26/22):  1. Severe disease of a ramus branch.  2. Patent prior LAD stent.  3. Small, non-dominant RCA.  4. Normal LVEDP (13mmHg) with no AS.  5. Successful PCI of the ramus with 2.5 x 8 mm Resolute DEBBY.        Assessment/Plan   Ms. Parker is a 78F with a PMHx sig for CAD s/p PCI (LAD; 2014, [place]; 9/2022) and stage C systolic HF/ICM/HFrEF with moderate LV dysfunction who returns to the Advanced Heart Failure clinic for ongoing evaluation and management.  At the current time she has functional class III symptoms and appears relatively euvolemic on exam.     1) Stage C acute on chronic systolic HF/ICM/HFrEF with moderate LV dysfunction (LVEF 35-40%; 12/2022)  Currently with significant dyspnea that is significantly affecting her QOL. She is currently on maximally tolerated GDMT. Trying to pursue cardiac contractility modulation (CCM).   -c/w metoprolol succinate 25 mg daily, entresto 24/26 mg bid, farxiga 10 mg daily, spironolactone 12.5 mg daily  -continue to look into approval for CCM    F/U: 3 months at /[facility]    Malachi,  Thank you for referring Ms. Parker to the  Advanced Heart Failure Clinic.  Please let me know if you have any questions.      Miguel Leon, DO

## 2023-10-20 DIAGNOSIS — I50.22 CHRONIC SYSTOLIC (CONGESTIVE) HEART FAILURE (MULTI): ICD-10-CM

## 2023-10-24 RX ORDER — DAPAGLIFLOZIN 10 MG/1
10 TABLET, FILM COATED ORAL DAILY
Qty: 90 TABLET | Refills: 3 | Status: SHIPPED | OUTPATIENT
Start: 2023-10-24 | End: 2024-04-15 | Stop reason: WASHOUT

## 2023-11-06 ENCOUNTER — OFFICE VISIT (OUTPATIENT)
Dept: OTOLARYNGOLOGY | Facility: CLINIC | Age: 78
End: 2023-11-06
Payer: MEDICARE

## 2023-11-06 VITALS — BODY MASS INDEX: 25.07 KG/M2 | HEIGHT: 66 IN | WEIGHT: 156 LBS

## 2023-11-06 DIAGNOSIS — H60.8X3 CHRONIC ECZEMATOUS OTITIS EXTERNA OF BOTH EARS: ICD-10-CM

## 2023-11-06 DIAGNOSIS — R05.3 CHRONIC COUGH: Primary | ICD-10-CM

## 2023-11-06 PROCEDURE — 99213 OFFICE O/P EST LOW 20 MIN: CPT | Performed by: OTOLARYNGOLOGY

## 2023-11-06 PROCEDURE — 1036F TOBACCO NON-USER: CPT | Performed by: OTOLARYNGOLOGY

## 2023-11-06 PROCEDURE — 1160F RVW MEDS BY RX/DR IN RCRD: CPT | Performed by: OTOLARYNGOLOGY

## 2023-11-06 PROCEDURE — 3078F DIAST BP <80 MM HG: CPT | Performed by: OTOLARYNGOLOGY

## 2023-11-06 PROCEDURE — 1159F MED LIST DOCD IN RCRD: CPT | Performed by: OTOLARYNGOLOGY

## 2023-11-06 PROCEDURE — 1125F AMNT PAIN NOTED PAIN PRSNT: CPT | Performed by: OTOLARYNGOLOGY

## 2023-11-06 PROCEDURE — 3077F SYST BP >= 140 MM HG: CPT | Performed by: OTOLARYNGOLOGY

## 2023-11-06 RX ORDER — BENZONATATE 200 MG/1
200 CAPSULE ORAL 3 TIMES DAILY PRN
Status: DISCONTINUED | OUTPATIENT
Start: 2023-11-06 | End: 2024-04-15

## 2023-11-06 RX ORDER — MOMETASONE FUROATE 1 MG/G
CREAM TOPICAL 2 TIMES DAILY
Qty: 15 G | Refills: 2 | Status: SHIPPED | OUTPATIENT
Start: 2023-11-06 | End: 2024-03-26 | Stop reason: WASHOUT

## 2023-11-06 RX ORDER — PREDNISONE 20 MG/1
TABLET ORAL
Qty: 9 TABLET | Refills: 0 | Status: SHIPPED | OUTPATIENT
Start: 2023-11-06 | End: 2023-11-28 | Stop reason: ALTCHOICE

## 2023-11-06 NOTE — PROGRESS NOTES
Patient returns.  We are seeing her today for follow-up check on chronic cough as well as new issue of chronic pruritus of the ear canals.  She denies any pain or drainage.  The remaining ENT inquiry is clear.  There have been no significant changes in past medical or past surgical histories except as mentioned.    Exam:  No acute distress.  The external ear structures appear normal. The ear canals patent and the tympanic membranes are intact without evidence of air-fluid levels, retraction, or congenital defects.  Anterior rhinoscopy notes essentially a midline nasal septum. Examination is noted for normal healthy mucosal membranes without any evidence of lesions, polyps, or exudate. The tongue is normally mobile. There are no lesions on the gingiva, buccal, or oral mucosa. There are no oral cavity masses.  The neck is negative for mass lymphadenopathy. The trachea and parotid are clear. The thyroid bed is grossly unremarkable. The salivary gland structures are grossly unremarkable.    Assessment and plan:  1.  Chronic cough.  2.  Chronic otitis externa variant.  We will start the patient back on a short burst of prednisone to see if it helps as it normally does make a significant positive benefit for her.  Additionally we will send her home with Tessalon Perles and Elocon cream for the ears.  Detailed discussion with her regarding the use of the cream.  Follow-up as needed.  All questions were answered in this regard accordingly.

## 2023-11-07 DIAGNOSIS — R05.3 CHRONIC COUGH: ICD-10-CM

## 2023-11-07 RX ORDER — BENZONATATE 200 MG/1
200 CAPSULE ORAL 3 TIMES DAILY PRN
Qty: 20 CAPSULE | Refills: 0 | Status: SHIPPED | OUTPATIENT
Start: 2023-11-07 | End: 2023-11-27

## 2023-11-07 RX ORDER — PREDNISONE 20 MG/1
TABLET ORAL
Qty: 9 TABLET | Refills: 0 | OUTPATIENT
Start: 2023-11-07

## 2023-11-28 ENCOUNTER — OFFICE VISIT (OUTPATIENT)
Dept: PRIMARY CARE | Facility: CLINIC | Age: 78
End: 2023-11-28
Payer: MEDICARE

## 2023-11-28 VITALS
SYSTOLIC BLOOD PRESSURE: 126 MMHG | HEART RATE: 73 BPM | HEIGHT: 66 IN | WEIGHT: 152 LBS | OXYGEN SATURATION: 98 % | RESPIRATION RATE: 14 BRPM | DIASTOLIC BLOOD PRESSURE: 66 MMHG | BODY MASS INDEX: 24.43 KG/M2

## 2023-11-28 DIAGNOSIS — E55.9 MILD VITAMIN D DEFICIENCY: ICD-10-CM

## 2023-11-28 DIAGNOSIS — I10 PRIMARY HYPERTENSION: ICD-10-CM

## 2023-11-28 DIAGNOSIS — Z00.00 MEDICARE ANNUAL WELLNESS VISIT, SUBSEQUENT: Primary | ICD-10-CM

## 2023-11-28 DIAGNOSIS — E78.00 HYPERCHOLESTEROLEMIA: ICD-10-CM

## 2023-11-28 DIAGNOSIS — Z00.00 ROUTINE GENERAL MEDICAL EXAMINATION AT HEALTH CARE FACILITY: ICD-10-CM

## 2023-11-28 DIAGNOSIS — Z78.0 ASYMPTOMATIC MENOPAUSAL STATE: ICD-10-CM

## 2023-11-28 DIAGNOSIS — R05.3 CHRONIC COUGH: ICD-10-CM

## 2023-11-28 DIAGNOSIS — Z00.00 PREVENTATIVE HEALTH CARE: ICD-10-CM

## 2023-11-28 PROCEDURE — 1125F AMNT PAIN NOTED PAIN PRSNT: CPT | Performed by: INTERNAL MEDICINE

## 2023-11-28 PROCEDURE — 3074F SYST BP LT 130 MM HG: CPT | Performed by: INTERNAL MEDICINE

## 2023-11-28 PROCEDURE — G0439 PPPS, SUBSEQ VISIT: HCPCS | Performed by: INTERNAL MEDICINE

## 2023-11-28 PROCEDURE — G0009 ADMIN PNEUMOCOCCAL VACCINE: HCPCS | Performed by: INTERNAL MEDICINE

## 2023-11-28 PROCEDURE — 1036F TOBACCO NON-USER: CPT | Performed by: INTERNAL MEDICINE

## 2023-11-28 PROCEDURE — 90471 IMMUNIZATION ADMIN: CPT | Performed by: INTERNAL MEDICINE

## 2023-11-28 PROCEDURE — 3078F DIAST BP <80 MM HG: CPT | Performed by: INTERNAL MEDICINE

## 2023-11-28 PROCEDURE — 90715 TDAP VACCINE 7 YRS/> IM: CPT | Performed by: INTERNAL MEDICINE

## 2023-11-28 PROCEDURE — 1170F FXNL STATUS ASSESSED: CPT | Performed by: INTERNAL MEDICINE

## 2023-11-28 PROCEDURE — 1160F RVW MEDS BY RX/DR IN RCRD: CPT | Performed by: INTERNAL MEDICINE

## 2023-11-28 PROCEDURE — 1159F MED LIST DOCD IN RCRD: CPT | Performed by: INTERNAL MEDICINE

## 2023-11-28 PROCEDURE — 90677 PCV20 VACCINE IM: CPT | Performed by: INTERNAL MEDICINE

## 2023-11-28 RX ORDER — BENZONATATE 200 MG/1
200 CAPSULE ORAL 3 TIMES DAILY PRN
Qty: 40 CAPSULE | Refills: 0 | Status: SHIPPED | OUTPATIENT
Start: 2023-11-28 | End: 2024-03-20

## 2023-11-28 ASSESSMENT — ACTIVITIES OF DAILY LIVING (ADL)
MANAGING_FINANCES: INDEPENDENT
GROCERY_SHOPPING: INDEPENDENT
DRESSING: INDEPENDENT
TAKING_MEDICATION: INDEPENDENT
DOING_HOUSEWORK: INDEPENDENT
BATHING: INDEPENDENT

## 2023-11-28 ASSESSMENT — ENCOUNTER SYMPTOMS
FEVER: 0
SHORTNESS OF BREATH: 0
VOMITING: 0
CHILLS: 0
JOINT SWELLING: 0
DIAPHORESIS: 0
NAUSEA: 0
MYALGIAS: 0
DIARRHEA: 0
COUGH: 0
CONSTIPATION: 0

## 2023-11-28 NOTE — PATIENT INSTRUCTIONS
TETANUS/PERTUSSIS AND PREVNAR-20 IMMUNIZATIONS ARE ADMINISTERED TO YOU TODAY    2.  FASTING LABS ARE ORDERED FOR YOU    3.  COVID-19 AND RSV IMMUNIZATIONS ARE RECOMMENDED TO GET AT THE PHARMACY    4.  LATER AFTER YOUR SYSTEM HAS HAD SOME TIME TO RECOVER FROM ALL THE IMMUNIZATIONS, THEN YOU CAN PROCEED WITH SHINGRIX IMMUNIZATION TO PREVENT SHINGLES, SCRIPT IS WRITTEN TO TAKE TO THE PHARMACY    5.  BONE DENSITY TEST IS ORDERED FOR YOU    6.  FOLLOW UP DR. QIU FOR EVENTUAL PLACEMENT OF MYOCARDIAL CONTRACTILITY MODULATION DEVICE    7.  FOLLOW UP 6 MONTHS OR AS NEEDED.

## 2023-11-28 NOTE — PROGRESS NOTES
Subjective   Reason for Visit: Gianna Parker is an 78 y.o. female here for a Medicare Wellness visit.    HAD FLU SHOT    WOULD LIKE rF ON TESSALON PEARLS FOR ONGOING COUGH               HPI  COUGH IS CONSTANT FROM SECOND HAND SMOKE.    STILL AWAITING FOR A SPECIAL HEART IMPLANT APPROVAL FROM INSURANCE    NEED PNEUMONIA SHOT      Patient Care Team:  Nando Lombardi MD as PCP - General (Internal Medicine)  Nando Lombardi MD as PCP - [employer] PCP     Review of Systems    Objective   Vitals:  There were no vitals taken for this visit.      Physical Exam    Assessment/Plan   Problem List Items Addressed This Visit    None

## 2023-11-28 NOTE — PROGRESS NOTES
"Subjective   Reason for Visit: Gianna Parker is an 78 y.o. female here for a Medicare Wellness visit.     Past Medical, Surgical, and Family History reviewed and updated in chart.         HPI  COUGH IS CONSTANT FROM SECOND HAND SMOKE.     STILL AWAITING FOR A SPECIAL HEART IMPLANT APPROVAL FROM INSURANCE     NEED PNEUMONIA SHOT     Patient Care Team:  Nando Lombardi MD as PCP - General (Internal Medicine)  Nando Lombardi MD as PCP - [employer]age PCP     Review of Systems   Constitutional:  Negative for chills, diaphoresis and fever.   Respiratory:  Negative for cough and shortness of breath.    Cardiovascular:  Negative for chest pain and leg swelling.   Gastrointestinal:  Negative for constipation, diarrhea, nausea and vomiting.   Musculoskeletal:  Negative for joint swelling and myalgias.       Objective   Vitals:  /66   Pulse 73   Resp 14   Ht 1.676 m (5' 6\")   Wt 68.9 kg (152 lb)   SpO2 98%   BMI 24.53 kg/m²       Physical Exam  Vitals reviewed.   Constitutional:       General: She is not in acute distress.     Appearance: She is not ill-appearing.   Cardiovascular:      Rate and Rhythm: Normal rate and regular rhythm.      Pulses: Normal pulses.      Heart sounds:      No gallop.   Pulmonary:      Breath sounds: Normal breath sounds. No wheezing, rhonchi or rales.   Abdominal:      General: Abdomen is flat. Bowel sounds are normal.      Palpations: Abdomen is soft.      Tenderness: There is no guarding or rebound.   Musculoskeletal:      Right lower leg: No edema.      Left lower leg: No edema.         Assessment/Plan   Problem List Items Addressed This Visit       Chronic cough    Relevant Medications    benzonatate (Tessalon) 200 mg capsule    HTN (hypertension)    Overview     Last Assessment & Plan: Formatting of this note might be different from the original. Assessment: on med for control ,monitored per PCP /79,pulse 98         Relevant Orders    Protein, " Urine Random    Hypercholesterolemia    Relevant Orders    Vitamin B12    Lipid Panel    TSH with reflex to Free T4 if abnormal    Comprehensive Metabolic Panel    CBC    Mild vitamin D deficiency    Relevant Orders    Vitamin D 25-Hydroxy,Total (for eval of Vitamin D levels)    Medicare annual wellness visit, subsequent - Primary     Other Visit Diagnoses       Preventative health care        Relevant Medications    zoster vaccine-recombinant adjuvanted (Shingrix) 50 mcg/0.5 mL vaccine    Other Relevant Orders    Tdap vaccine, age 7 years and older  (BOOSTRIX) (Completed)    Pneumococcal conjugate vaccine, 20-valent (PREVNAR 20) (Completed)    Asymptomatic menopausal state        Relevant Orders    XR DEXA bone density    Routine general medical examination at health care facility              Patient Instructions    TETANUS/PERTUSSIS AND PREVNAR-20 IMMUNIZATIONS ARE ADMINISTERED TO YOU TODAY    2.  FASTING LABS ARE ORDERED FOR YOU    3.  COVID-19 AND RSV IMMUNIZATIONS ARE RECOMMENDED TO GET AT THE PHARMACY    4.  LATER AFTER YOUR SYSTEM HAS HAD SOME TIME TO RECOVER FROM ALL THE IMMUNIZATIONS, THEN YOU CAN PROCEED WITH SHINGRIX IMMUNIZATION TO PREVENT SHINGLES, SCRIPT IS WRITTEN TO TAKE TO THE PHARMACY    5.  BONE DENSITY TEST IS ORDERED FOR YOU    6.  FOLLOW UP DR. QIU FOR EVENTUAL PLACEMENT OF MYOCARDIAL CONTRACTILITY MODULATION DEVICE    7.  FOLLOW UP 6 MONTHS OR AS NEEDED.

## 2023-11-30 ENCOUNTER — HOSPITAL ENCOUNTER (OUTPATIENT)
Dept: RADIOLOGY | Facility: HOSPITAL | Age: 78
Discharge: HOME | End: 2023-11-30
Payer: MEDICARE

## 2023-11-30 DIAGNOSIS — Z78.0 ASYMPTOMATIC MENOPAUSAL STATE: ICD-10-CM

## 2023-11-30 PROCEDURE — 77080 DXA BONE DENSITY AXIAL: CPT | Performed by: RADIOLOGY

## 2023-11-30 PROCEDURE — 77080 DXA BONE DENSITY AXIAL: CPT

## 2024-01-15 ENCOUNTER — OFFICE VISIT (OUTPATIENT)
Dept: CARDIOLOGY | Facility: CLINIC | Age: 79
End: 2024-01-15
Payer: MEDICARE

## 2024-01-15 VITALS
DIASTOLIC BLOOD PRESSURE: 77 MMHG | SYSTOLIC BLOOD PRESSURE: 139 MMHG | HEIGHT: 66 IN | HEART RATE: 71 BPM | WEIGHT: 152 LBS | BODY MASS INDEX: 24.43 KG/M2

## 2024-01-15 DIAGNOSIS — I50.20 SYSTOLIC HEART FAILURE, ACC/AHA STAGE C (MULTI): Primary | ICD-10-CM

## 2024-01-15 DIAGNOSIS — I25.5 ISCHEMIC CARDIOMYOPATHY: ICD-10-CM

## 2024-01-15 PROCEDURE — 99213 OFFICE O/P EST LOW 20 MIN: CPT | Performed by: INTERNAL MEDICINE

## 2024-01-15 PROCEDURE — 1160F RVW MEDS BY RX/DR IN RCRD: CPT | Performed by: INTERNAL MEDICINE

## 2024-01-15 PROCEDURE — 3075F SYST BP GE 130 - 139MM HG: CPT | Performed by: INTERNAL MEDICINE

## 2024-01-15 PROCEDURE — 1125F AMNT PAIN NOTED PAIN PRSNT: CPT | Performed by: INTERNAL MEDICINE

## 2024-01-15 PROCEDURE — 1159F MED LIST DOCD IN RCRD: CPT | Performed by: INTERNAL MEDICINE

## 2024-01-15 PROCEDURE — 3078F DIAST BP <80 MM HG: CPT | Performed by: INTERNAL MEDICINE

## 2024-01-15 PROCEDURE — 1036F TOBACCO NON-USER: CPT | Performed by: INTERNAL MEDICINE

## 2024-01-15 NOTE — PROGRESS NOTES
[facility] Advanced Heart Failure Clinic  Primary Care Physician: Nando Lombardi MD  Referring Provider/Cardiologist: Saba (/[facility])     Date of Visit: 01/15/2024  4:00 PM EST  Location of visit: [address]     HPI:   Ms. Parker is a 78F with a PMHx sig for CAD s/p PCI (LAD; , [place]; 2022) and stage C systolic HF/ICM/HFrEF with moderate LV dysfunction who returns to the Advanced Heart Failure clinic for ongoing evaluation and management.       Interval hx:   Denies chest pain, chest pressure, PND. No edema noted in BLE.   Denies falls.  Patient reports SOB, KOVACS, orthopnea.   She states if he goes up or down a flight of stairs, her heart will beat really fast and she feels it in her chest.   She reports headaches, dizziness, and lightheadedness. The dizziness/lightheadedness can come on at any time whether she is exerting herself or not.   She has fatigue daily.     Still waiting for insurance approval re: [facility]; additional meeting with  scheduled for this week.      Hospitalizations: Admitted -2023 for parotiditis         PM/SHx:   CAD s/p PCI (LAD; , [place]; 2022), stage C systolic HF/ICM/HFrEF with moderate LV dysfunction      SocHx:   Lives in [place]   Denies smoking, ETOH, illicits     FamHx:   Father had  of cardiac arrest at the age of 62, Mother was 97 when she passed but had heart disease.      Current Outpatient Medications   Medication Sig Dispense Refill    albuterol 90 mcg/actuation aerosol powdr breath activated inhaler Inhale 2 puffs every 4 hours if needed.      aspirin 81 mg EC tablet Take 1 tablet (81 mg) by mouth once daily.      citalopram (CeleXA) 20 mg tablet Take 1 tablet (20 mg) by mouth once daily.      doxepin (SINEquan) 25 mg capsule Take 1 capsule (25 mg) by mouth once daily.      doxycycline (Adoxa) 100 mg tablet Take 1 tablet (100 mg) by mouth once daily.      Entresto 24-26 mg tablet Take 1 tablet by mouth 2 times a day.       EPINEPHrine 0.3 mg/0.3 mL injection syringe Inject 0.3 mL (0.3 mg) as directed if needed.      Farxiga 10 mg TAKE 1 TABLET BY MOUTH EVERY DAY 90 tablet 3    HYDROcodone-acetaminophen (Norco) 5-325 mg tablet Take 1 tablet by mouth. EVERY 4 TO 6 HOURS FOR PAIN      ibuprofen 600 mg tablet Take 1 tablet (600 mg) by mouth 3 times a day.      metoprolol succinate XL (Toprol-XL) 25 mg 24 hr tablet Take 1 tablet (25 mg) by mouth once daily.      mirtazapine (Remeron) 15 mg tablet Take 1 tablet (15 mg) by mouth once daily at bedtime.      montelukast (Singulair) 10 mg tablet Take 1 tablet (10 mg) by mouth once daily. 90 tablet 3    nitroglycerin (Nitrostat) 0.3 mg SL tablet Place 1 tablet (0.3 mg) under the tongue every 5 minutes if needed.      nystatin (Mycostatin) 100,000 unit/mL suspension Take 4 mL (400,000 Units) by mouth every 6 hours. For oral thrush      omeprazole (PriLOSEC) 40 mg DR capsule Take 1 capsule (40 mg) by mouth 2 times a day. 180 capsule 3    oxybutynin XL (Ditropan-XL) 10 mg 24 hr tablet TAKE 1 TABLET TWICE A  tablet 3    PARoxetine CR (Paxil-CR) 25 mg 24 hr tablet Take 1 tablet (25 mg) by mouth once daily.      rosuvastatin (Crestor) 20 mg tablet Take 1 tablet (20 mg) by mouth once daily.      spironolactone (Aldactone) 25 mg tablet Take 1 tablet (25 mg) by mouth once daily.      mometasone (Elocon) 0.1 % cream Apply topically 2 times a day for 7 days. 15 g 2     Current Facility-Administered Medications   Medication Dose Route Frequency Provider Last Rate Last Admin    benzonatate (Tessalon) capsule 200 mg  200 mg oral TID PRN Stan Payan MD           Allergies   Allergen Reactions    Bee Venom Protein (Honey Bee) Anaphylaxis, Unknown and Other    Sulfa (Sulfonamide Antibiotics) Rash, Unknown, Other and Anaphylaxis     SULFONAMIDE ANTIBIOTICS    angioedema, hives    Bacitracin Swelling    Clindamycin Other    Fish Containing Products Unknown    Nitrofurantoin Macrocrystal Unknown     "Oxycodone Other    Shellfish Containing Products Unknown    Tetracycline Swelling    Nitrofurantoin Rash and Unknown     burning    Penicillins Itching, Rash, Unknown and Other     When Young; tolerated ceftriaxone Dec 2022    angioedema, hives    Promethazine Other and Rash     NECROTIC TISSUE TO HAND AFTER IV ADMINISTATION, PT. HAD TO HAVE SKIN GRAFTED         Visit Vitals  /77 (BP Location: Right arm, Patient Position: Sitting)   Pulse 71   Ht 1.676 m (5' 6\")   Wt 68.9 kg (152 lb)   BMI 24.53 kg/m²   Smoking Status Never   BSA 1.79 m²        Physical Exam:  On exam Ms. Parker appears her stated age, is alert and oriented x3, and in no acute distress. Her sclera are anicteric and her oropharynx has moist mucous membranes. Her neck is supple and without thyromegaly. The JVP is ~8 cm of water above the right atrium. Her cardiac exam has regular rhythm, normal S1, S2. No S3/4. There are no murmurs. Her lungs are clear to auscultation bilaterally and there is no dullness to percussion. Her abdomen is soft, nontender with normoactive bowel sounds. There is no HJR. The extremities are warm and without edema. The skin is dry. There is no rash present. The distal pulses are 2+ in all four extremities. Her mood and affect are appropriate for todays encounter.      Cardiac Labs/Diagnostics:    Lab Results   Component Value Date    CREATININE 0.80 02/23/2023    BUN 18 02/23/2023     02/23/2023    K 4.2 02/23/2023     (H) 02/23/2023    CO2 26 02/23/2023        Recent Labs     03/16/20  2250   *     ECG (4/18/23):  Sinus rhythm (HR 78), LAD     Echo (12/19/22):  1. Left ventricular systolic function is mildly to moderately decreased with a 35-40% estimated ejection fraction.  2. Entire apex is abnormal.  3. Overall mild to moderate regional LV systolic dysfunction with an apical wall motion abnormality. Would have benefitted from use of echocontrast to exclude LV apical thrombus given apical akinesis, " however unable to obtain iv access.  4. Spectral Doppler shows an impaired relaxation pattern of left ventricular diastolic filling.  5. There is moderate mitral annular calcification.  6. RVSP within normal limits.  7. No prior echocardiogram available for comparison.     Cardiac cath (9/26/22):  1. Severe disease of a ramus branch.  2. Patent prior LAD stent.  3. Small, non-dominant RCA.  4. Normal LVEDP (13mmHg) with no AS.  5. Successful PCI of the ramus with 2.5 x 8 mm Resolute DEBBY.      Impression/Plan:  Ms. Parker is a 78F with a PMHx sig for CAD s/p PCI (LAD; 2014, [place]; 9/2022) and stage C systolic HF/ICM/HFrEF with moderate LV dysfunction who returns to the Advanced Heart Failure clinic for ongoing evaluation and management. At the current time she has functional class III symptoms and appears relatively euvolemic on exam.      1) Stage C acute on chronic systolic HF/ICM/HFrEF with moderate LV dysfunction (LVEF 35-40%; 12/2022)  Currently with significant dyspnea that is significantly affecting her QOL. She is currently on maximally tolerated GDMT. Trying to pursue cardiac contractility modulation (CCM).   -c/w metoprolol succinate 25 mg daily, entresto 24/26 mg bid, farxiga 10 mg daily, spironolactone 12.5 mg daily  -continue to look into approval for CCM       F/U: 3 months at /[facility]       Malachi  Thank you for referring Ms. Parker to the  Advanced Heart Failure Clinic. Please let me know if you have any questions.      ____________________________________________________________  Miguel Leon DO  Section of Advanced Heart Failure and Cardiac Transplantation  Division of Cardiovascular Medicine  [facility]  [facility]

## 2024-01-15 NOTE — PATIENT INSTRUCTIONS
It was a pleasure seeing you today. Please contact myself or my team with any questions.     To reach Dr. Leon' office please call [phone] ([place]).   Fax: [phone]   To schedule an appointment call [phone]     If you have any questions or need cardiac medication refills, please call the Heart Failure office at [phone], option 6. You may also contact the  Heart Failure Nursing team via email at [email or web address] (Please include your name and date of birth).         1) Continue your medications  2) Follow up in 3 months at /[facility]

## 2024-01-22 PROBLEM — E78.2 MIXED HYPERLIPIDEMIA: Status: RESOLVED | Noted: 2023-09-04 | Resolved: 2024-01-22

## 2024-01-22 PROBLEM — I50.20 SYSTOLIC HEART FAILURE, ACC/AHA STAGE C (MULTI): Status: RESOLVED | Noted: 2023-09-04 | Resolved: 2024-01-22

## 2024-01-22 PROBLEM — I20.0 UNSTABLE ANGINA (MULTI): Status: RESOLVED | Noted: 2023-09-04 | Resolved: 2024-01-22

## 2024-01-22 PROBLEM — E78.5 DYSLIPIDEMIA: Status: ACTIVE | Noted: 2023-09-04

## 2024-01-22 PROBLEM — R07.9 CHEST PAIN: Status: RESOLVED | Noted: 2022-09-20 | Resolved: 2024-01-22

## 2024-01-22 PROBLEM — I10 HTN (HYPERTENSION): Status: RESOLVED | Noted: 2023-09-04 | Resolved: 2024-01-22

## 2024-01-22 PROBLEM — I83.893 VARICOSE VEINS OF BILATERAL LOWER EXTREMITIES WITH OTHER COMPLICATIONS: Status: RESOLVED | Noted: 2023-09-04 | Resolved: 2024-01-22

## 2024-01-22 PROBLEM — I25.5 ISCHEMIC CARDIOMYOPATHY: Status: RESOLVED | Noted: 2023-09-04 | Resolved: 2024-01-22

## 2024-01-30 ENCOUNTER — OFFICE VISIT (OUTPATIENT)
Dept: PRIMARY CARE | Facility: CLINIC | Age: 79
End: 2024-01-30
Payer: MEDICARE

## 2024-01-30 VITALS
OXYGEN SATURATION: 98 % | RESPIRATION RATE: 14 BRPM | BODY MASS INDEX: 24.37 KG/M2 | WEIGHT: 151 LBS | DIASTOLIC BLOOD PRESSURE: 70 MMHG | SYSTOLIC BLOOD PRESSURE: 118 MMHG | HEART RATE: 80 BPM

## 2024-01-30 DIAGNOSIS — I25.119 CORONARY ARTERY DISEASE INVOLVING NATIVE CORONARY ARTERY OF NATIVE HEART WITH ANGINA PECTORIS (CMS-HCC): ICD-10-CM

## 2024-01-30 DIAGNOSIS — I50.22 CHRONIC SYSTOLIC HEART FAILURE (MULTI): ICD-10-CM

## 2024-01-30 DIAGNOSIS — F33.2 MAJOR DEPRESSIVE DISORDER, RECURRENT SEVERE WITHOUT PSYCHOTIC FEATURES (MULTI): ICD-10-CM

## 2024-01-30 DIAGNOSIS — M81.0 AGE-RELATED OSTEOPOROSIS WITHOUT CURRENT PATHOLOGICAL FRACTURE: Primary | ICD-10-CM

## 2024-01-30 DIAGNOSIS — J44.9 CHRONIC OBSTRUCTIVE PULMONARY DISEASE, UNSPECIFIED COPD TYPE (MULTI): ICD-10-CM

## 2024-01-30 DIAGNOSIS — K86.1 CHRONIC RECURRENT PANCREATITIS (MULTI): ICD-10-CM

## 2024-01-30 PROBLEM — I82.409 DEEP VENOUS THROMBOSIS (MULTI): Status: RESOLVED | Noted: 2023-09-04 | Resolved: 2024-01-30

## 2024-01-30 PROCEDURE — 1160F RVW MEDS BY RX/DR IN RCRD: CPT | Performed by: INTERNAL MEDICINE

## 2024-01-30 PROCEDURE — 1036F TOBACCO NON-USER: CPT | Performed by: INTERNAL MEDICINE

## 2024-01-30 PROCEDURE — 99214 OFFICE O/P EST MOD 30 MIN: CPT | Performed by: INTERNAL MEDICINE

## 2024-01-30 PROCEDURE — 3078F DIAST BP <80 MM HG: CPT | Performed by: INTERNAL MEDICINE

## 2024-01-30 PROCEDURE — 3074F SYST BP LT 130 MM HG: CPT | Performed by: INTERNAL MEDICINE

## 2024-01-30 PROCEDURE — 1157F ADVNC CARE PLAN IN RCRD: CPT | Performed by: INTERNAL MEDICINE

## 2024-01-30 PROCEDURE — 1159F MED LIST DOCD IN RCRD: CPT | Performed by: INTERNAL MEDICINE

## 2024-01-30 PROCEDURE — 1125F AMNT PAIN NOTED PAIN PRSNT: CPT | Performed by: INTERNAL MEDICINE

## 2024-01-30 RX ORDER — ALENDRONATE SODIUM 70 MG/1
70 TABLET ORAL
Qty: 4 TABLET | Refills: 11 | Status: SHIPPED | OUTPATIENT
Start: 2024-01-30 | End: 2025-01-29

## 2024-01-30 ASSESSMENT — ENCOUNTER SYMPTOMS
DIARRHEA: 0
MYALGIAS: 0
ROS GI COMMENTS: PER HPI
FEVER: 0
CHILLS: 0
NAUSEA: 0
SHORTNESS OF BREATH: 0
DIAPHORESIS: 0
COUGH: 0
JOINT SWELLING: 0
CONSTIPATION: 0
VOMITING: 0

## 2024-01-30 ASSESSMENT — PATIENT HEALTH QUESTIONNAIRE - PHQ9
1. LITTLE INTEREST OR PLEASURE IN DOING THINGS: NOT AT ALL
SUM OF ALL RESPONSES TO PHQ9 QUESTIONS 1 AND 2: 0
2. FEELING DOWN, DEPRESSED OR HOPELESS: NOT AT ALL

## 2024-01-30 NOTE — PATIENT INSTRUCTIONS
START ALENDRONATE 70 MG ONCE WEEKLY FOR OSTEOPOROSIS TREATMENT.  WE DISCUSSED THAT NEED TO MAKE SURE THAT THE PILL DOES NOT GET STUCK IN THE ESOPHAGUS    2.  START VITAMIN D3 2000 IU DAILY WILL WORK IN CONJUNCTION WITH ALENDRONATE TO BUILD BACK BONE    3.  AFTER 2-3 YEARS WILL REPEAT THE BONE DENSITY TO SEE HOW THE BONE IS IMPROVING.        4.  TOTAL COURSE OF TREATMENT WILL BE 5 YEARS, AND THAT WILL LAST THE REST OF YOUR DAYS    5.  FOLLOW UP ROUTINE OR AS NEEDED.
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
## 2024-02-29 ENCOUNTER — APPOINTMENT (OUTPATIENT)
Dept: CARDIOLOGY | Facility: CLINIC | Age: 79
End: 2024-02-29
Payer: MEDICARE

## 2024-03-20 DIAGNOSIS — R05.3 CHRONIC COUGH: ICD-10-CM

## 2024-03-20 RX ORDER — BENZONATATE 200 MG/1
200 CAPSULE ORAL 3 TIMES DAILY PRN
Qty: 40 CAPSULE | Refills: 0 | Status: SHIPPED | OUTPATIENT
Start: 2024-03-20 | End: 2024-04-03

## 2024-03-26 ENCOUNTER — OFFICE VISIT (OUTPATIENT)
Dept: CARDIOLOGY | Facility: CLINIC | Age: 79
End: 2024-03-26
Payer: MEDICARE

## 2024-03-26 VITALS
SYSTOLIC BLOOD PRESSURE: 130 MMHG | HEART RATE: 84 BPM | WEIGHT: 157 LBS | DIASTOLIC BLOOD PRESSURE: 80 MMHG | HEIGHT: 66 IN | BODY MASS INDEX: 25.23 KG/M2 | OXYGEN SATURATION: 95 %

## 2024-03-26 DIAGNOSIS — I10 BENIGN ESSENTIAL HYPERTENSION: ICD-10-CM

## 2024-03-26 DIAGNOSIS — I25.119 CORONARY ARTERY DISEASE INVOLVING NATIVE CORONARY ARTERY OF NATIVE HEART WITH ANGINA PECTORIS (CMS-HCC): Primary | ICD-10-CM

## 2024-03-26 DIAGNOSIS — E78.5 DYSLIPIDEMIA: ICD-10-CM

## 2024-03-26 DIAGNOSIS — I50.20 HFREF (HEART FAILURE WITH REDUCED EJECTION FRACTION) (MULTI): ICD-10-CM

## 2024-03-26 PROCEDURE — 3079F DIAST BP 80-89 MM HG: CPT | Performed by: INTERNAL MEDICINE

## 2024-03-26 PROCEDURE — 1036F TOBACCO NON-USER: CPT | Performed by: INTERNAL MEDICINE

## 2024-03-26 PROCEDURE — 99214 OFFICE O/P EST MOD 30 MIN: CPT | Performed by: INTERNAL MEDICINE

## 2024-03-26 PROCEDURE — 1159F MED LIST DOCD IN RCRD: CPT | Performed by: INTERNAL MEDICINE

## 2024-03-26 PROCEDURE — 1160F RVW MEDS BY RX/DR IN RCRD: CPT | Performed by: INTERNAL MEDICINE

## 2024-03-26 PROCEDURE — 1157F ADVNC CARE PLAN IN RCRD: CPT | Performed by: INTERNAL MEDICINE

## 2024-03-26 PROCEDURE — 3075F SYST BP GE 130 - 139MM HG: CPT | Performed by: INTERNAL MEDICINE

## 2024-03-26 NOTE — PROGRESS NOTES
Chief Complaint:   No chief complaint on file.     History Of Present Illness:    Ginana Parker is a 78 y.o. female with a history of CAD s/p MI (PCI to the LAD 2014 and PCI to ramus 9/22/2022), chronic systolic heart failure, dyslipidemia, obesity, and hypertension here for follow-up.     Couple episodes of chest pain when dealing with insurance regarding her CCM.  It is still been denied by insurance.  She has been contacted by representatives who have informed her that there may be a study that she could enroll in through the [facility].     Otherwise no exertional chest pain.  Shortness of breath is chronic and unchanged.    Reviewed Dr. Leon's note from January 2024.     Echocardiogram 12/19/2022: EF 35 to 40%. Hypokinesis of the apex. Grade 1 diastolic dysfunction. RVSP normal at 25 mmHg.     PCI to the ramus 9/22/2022: Resolute Steve 2.5 x 8 mm DEBBY     Catheterization 1/27/2015: Left main with no significant disease. LAD with widely patent previously placed stents. LCx is dominant with no significant stenosis. Ramus intermedius has an ostial 50% stenosis. RCA is small and nondominant.     PCI of LAD 3/16/2014: LAD stented with Promus 2.5 x 24 mm DEBBY      Past Medical History:  She has no past medical history on file.    Past Surgical History:  She has no past surgical history on file.      Social History:  She reports that she has never smoked. She has never used smokeless tobacco. She reports that she does not currently use alcohol. She reports that she does not use drugs.    Family History:  Family History   Problem Relation Name Age of Onset    Coronary artery disease Mother      Coronary artery disease Father      Coronary artery disease Other sibling         Allergies:  Bee venom protein (honey bee), Sulfa (sulfonamide antibiotics), Bacitracin, Clindamycin, Fish containing products, Nitrofurantoin macrocrystal, Oxycodone, Shellfish containing products, Tetracycline, Nitrofurantoin, Penicillins, and  "Promethazine    Outpatient Medications:  Current Outpatient Medications   Medication Instructions    albuterol 90 mcg/actuation aerosol powdr breath activated inhaler 2 puffs, inhalation, Every 4 hours PRN    alendronate (FOSAMAX) 70 mg, oral, Every 7 days, Take in the morning with a full glass of water, on an empty stomach, and do not take anything else by mouth or lie down for the next 30 min.    aspirin 81 mg EC tablet 1 tablet, oral, Daily    benzonatate (TESSALON) 200 mg, oral, 3 times daily PRN, Do not crush or chew.    citalopram (CeleXA) 20 mg tablet 1 tablet, oral, Daily    doxepin (SINEquan) 25 mg capsule 1 capsule, oral, Daily    Entresto 24-26 mg tablet 1 tablet, oral, 2 times daily    EPINEPHrine 0.3 mg/0.3 mL injection syringe 1 Syringe, injection, As needed    Farxiga 10 mg, oral, Daily    ibuprofen 600 mg tablet 1 tablet, oral, 3 times daily    metoprolol succinate XL (TOPROL-XL) 25 mg, oral, Daily    mirtazapine (Remeron) 15 mg tablet 1 tablet, oral, Nightly    mometasone (Elocon) 0.1 % cream Topical, 2 times daily    montelukast (SINGULAIR) 10 mg, oral, Daily    nitroglycerin (Nitrostat) 0.3 mg SL tablet 1 tablet, sublingual, Every 5 min PRN    nystatin (Mycostatin) 100,000 unit/mL suspension 4 mL, oral, Every 6 hours, For oral thrush    omeprazole (PRILOSEC) 40 mg, oral, 2 times daily    oxybutynin XL (DITROPAN-XL) 10 mg, oral, 2 times daily    PARoxetine CR (PAXIL-CR) 25 mg, oral, Daily RT    rosuvastatin (CRESTOR) 20 mg, oral, Daily    spironolactone (Aldactone) 25 mg tablet 1 tablet, oral, Daily       Last Recorded Vitals:  Visit Vitals  /80 (BP Location: Left arm, Patient Position: Sitting)   Pulse 84   Ht 1.676 m (5' 6\")   Wt 71.2 kg (157 lb)   SpO2 95%   BMI 25.34 kg/m²   Smoking Status Never   BSA 1.82 m²      LASTWT(3):   Wt Readings from Last 3 Encounters:   03/26/24 71.2 kg (157 lb)   01/30/24 68.5 kg (151 lb)   01/15/24 68.9 kg (152 lb)       Physical Exam:  In general: alert and " in no acute distress.   HEENT: Carotid upstrokes normal with no bruits. JVP is normal.   Pulmonary: Clear to auscultation bilaterally.  Cardiovascular: S1,S2, regular.  II/VI systolic murmur at the apex.  Lower extremities: Warm. 2+ distal pulses. No edema.  Chronic venous stasis changes noted.     Last Labs:  CBC -  Recent Labs     12/29/22  0613 12/28/22  0550 12/27/22  1011   WBC 18.2* 26.0* 20.0*   HGB 8.4* 9.6* 9.7*   HCT 27.3* 30.8* 31.5*    328 310   MCV 82 81 82       CMP -  Recent Labs     02/23/23  1202 01/12/23  1149 12/29/22  0613 12/28/22  0550 12/27/22  1011 12/26/22  0406    138 141 137 136 137   K 4.2 3.8 3.2* 3.8 3.7 3.8   * 108* 115* 109* 107 108*   CO2 26 20* 20* 21 20* 19*   ANIONGAP 8* 14 9* 11 13 14   BUN 18 24* 32* 37* 30* 24*   CREATININE 0.80 0.92 0.72 0.83 1.08* 0.95   MG  --   --   --  1.86 1.71 1.60     Recent Labs     01/12/23  1149 12/29/22  0613 12/28/22  0550 12/27/22  1011 12/26/22  0406 03/16/20  2250   ALBUMIN 4.2 3.0* 3.5   < > 3.8 3.8   ALKPHOS 100  --   --   --  92 104   ALT 12  --   --   --  10 17   AST 13  --   --   --  14 21   BILITOT 0.8  --   --   --  0.6 0.7    < > = values in this interval not displayed.       LIPID PANEL -   Recent Labs     12/08/20  0928 12/20/19  1020 05/02/19  1034   CHOL 106 110 105   LDLF 41 47 46   HDL 33.0* 45.0 35.0*   TRIG 161* 89 121       Recent Labs     03/16/20  2250 12/20/19  1020   *  --    HGBA1C  --  6.6           Assessment/Plan   1) CAD: Continue with current regimen. Continue on aspirin and statin therapy.     2) chronic systolic heart failure: Continue with EP and heart failure.  She will wait to see if she may be a candidate for CCM through [facility].     3) dyslipidemia: Continue statin therapy     4) hypertension: Blood pressure relatively well controlled. No changes needed     5) follow-up: 12 months or sooner if needed      Malachi Walter MD

## 2024-04-04 ENCOUNTER — DOCUMENTATION (OUTPATIENT)
Dept: CARDIOLOGY | Facility: CLINIC | Age: 79
End: 2024-04-04
Payer: MEDICARE

## 2024-04-04 NOTE — PROGRESS NOTES
Received request for Peer to Peer for CCM device.  I contacted [facility] [phone] to obtain phone number and denial authorization code  Medicare contact number [phone]  Denial authorization [id number]    Left voicemail for Peer to Peer scheduling for pt for meeting this afternoon  [employer] [id number]    Spoke with Peer to Peer Dr. Maki this afternoon  Discussed that pt has NYHA II / III on optimal HF medications and unable to titrate medications further due to hypotension. Pt is not a candidate for biv device due to QRS width.   Peer to peer reports that no exception can be made.  Medicare cannot authorize CCM.  [facility] cannot authorize CCM.    I inquired what is next step to be able to have CCM approved.  It was recommended to me that Bruce, the external Medicare review, be contacted as Bruce can override the decision if there are compelling data.    Will update Dr. Leon regarding above

## 2024-04-15 ENCOUNTER — OFFICE VISIT (OUTPATIENT)
Dept: CARDIOLOGY | Facility: CLINIC | Age: 79
End: 2024-04-15
Payer: MEDICARE

## 2024-04-15 VITALS
DIASTOLIC BLOOD PRESSURE: 76 MMHG | HEIGHT: 66 IN | BODY MASS INDEX: 25.23 KG/M2 | SYSTOLIC BLOOD PRESSURE: 124 MMHG | OXYGEN SATURATION: 97 % | WEIGHT: 157 LBS | HEART RATE: 120 BPM

## 2024-04-15 DIAGNOSIS — I50.20 HFREF (HEART FAILURE WITH REDUCED EJECTION FRACTION) (MULTI): Primary | ICD-10-CM

## 2024-04-15 DIAGNOSIS — I25.5 ISCHEMIC CARDIOMYOPATHY: ICD-10-CM

## 2024-04-15 PROCEDURE — 93000 ELECTROCARDIOGRAM COMPLETE: CPT | Performed by: INTERNAL MEDICINE

## 2024-04-15 PROCEDURE — 1159F MED LIST DOCD IN RCRD: CPT | Performed by: INTERNAL MEDICINE

## 2024-04-15 PROCEDURE — 1160F RVW MEDS BY RX/DR IN RCRD: CPT | Performed by: INTERNAL MEDICINE

## 2024-04-15 PROCEDURE — 3074F SYST BP LT 130 MM HG: CPT | Performed by: INTERNAL MEDICINE

## 2024-04-15 PROCEDURE — 3078F DIAST BP <80 MM HG: CPT | Performed by: INTERNAL MEDICINE

## 2024-04-15 PROCEDURE — 1036F TOBACCO NON-USER: CPT | Performed by: INTERNAL MEDICINE

## 2024-04-15 PROCEDURE — 99213 OFFICE O/P EST LOW 20 MIN: CPT | Performed by: INTERNAL MEDICINE

## 2024-04-15 PROCEDURE — 1157F ADVNC CARE PLAN IN RCRD: CPT | Performed by: INTERNAL MEDICINE

## 2024-04-15 RX ORDER — BENZONATATE 200 MG/1
200 CAPSULE ORAL 3 TIMES DAILY PRN
COMMUNITY

## 2024-04-15 NOTE — LETTER
April 15, 2024       No Recipients    Patient: Gianna Parker   YOB: 1945   Date of Visit: 4/15/2024       Dear Dr. Hamilton Recipients:    Thank you for referring Gianna Parker to me for evaluation. Below are my notes for this consultation.  If you have questions, please do not hesitate to call me. I look forward to following your patient along with you.       Sincerely,     Miguel Leon,       CC:   No Recipients  ______________________________________________________________________________________        [facility]  Primary Care Physician: Nando Lombardi MD  Referring Provider/Cardiologist: Saba (/[facility])      Date of Visit: 04/15/2024  3:40 PM EDT  Location of visit: [address]     HPI:   Ms. Parker is a 78F with a PMHx sig for CAD s/p PCI (LAD; , [place]; 2022) and stage C systolic HF/ICM/HFrEF with moderate LV dysfunction who returns to the Advanced Heart Failure clinic for ongoing evaluation and management.       Interval hx:   Multiple attempts to get approval for CCM have failed; including most recently an appeal to the insurance medical director.     Currently denies chest pain. She has complaints of palpitations, shortness of breath, dyspnea on exertion, orthopnea, positive PND. No edema noted in BLE. She has complaints of headaches, dizziness. Denies recent falls.      Hospitalizations: Denies         PM/SHx:   CAD s/p PCI (LAD; , [place]; 2022), stage C systolic HF/ICM/HFrEF with moderate LV dysfunction      SocHx:   Lives in [place]   Denies smoking, ETOH, illicits     FamHx:   Father had  of cardiac arrest at the age of 62, Mother was 97 when she passed but had heart disease.        Current Outpatient Medications   Medication Sig Dispense Refill   • albuterol 90 mcg/actuation aerosol powdr breath activated inhaler Inhale 2 puffs every 4 hours if needed.     • alendronate (Fosamax) 70 mg tablet Take 1 tablet (70  mg) by mouth every 7 days. Take in the morning with a full glass of water, on an empty stomach, and do not take anything else by mouth or lie down for the next 30 min. 4 tablet 11   • aspirin 81 mg EC tablet Take 1 tablet (81 mg) by mouth once daily.     • benzonatate (Tessalon) 200 mg capsule Take 1 capsule (200 mg) by mouth 3 times a day as needed for cough. Do not crush or chew.     • Entresto 24-26 mg tablet Take 1 tablet by mouth 2 times a day.     • EPINEPHrine 0.3 mg/0.3 mL injection syringe Inject 0.3 mL (0.3 mg) as directed if needed.     • ibuprofen 600 mg tablet Take 1 tablet (600 mg) by mouth 3 times a day.     • metoprolol succinate XL (Toprol-XL) 25 mg 24 hr tablet Take 1 tablet (25 mg) by mouth once daily.     • mirtazapine (Remeron) 15 mg tablet Take 1 tablet (15 mg) by mouth once daily at bedtime.     • montelukast (Singulair) 10 mg tablet Take 1 tablet (10 mg) by mouth once daily. 90 tablet 3   • nitroglycerin (Nitrostat) 0.3 mg SL tablet Place 1 tablet (0.3 mg) under the tongue every 5 minutes if needed.     • nystatin (Mycostatin) 100,000 unit/mL suspension Take 4 mL (400,000 Units) by mouth every 6 hours. For oral thrush     • omeprazole (PriLOSEC) 40 mg DR capsule Take 1 capsule (40 mg) by mouth 2 times a day. 180 capsule 3   • oxybutynin XL (Ditropan-XL) 10 mg 24 hr tablet TAKE 1 TABLET TWICE A  tablet 3   • PARoxetine CR (Paxil-CR) 25 mg 24 hr tablet Take 1 tablet (25 mg) by mouth once daily.     • rosuvastatin (Crestor) 20 mg tablet Take 1 tablet (20 mg) by mouth once daily.     • spironolactone (Aldactone) 25 mg tablet Take 1 tablet (25 mg) by mouth once daily.     • citalopram (CeleXA) 20 mg tablet Take 1 tablet (20 mg) by mouth once daily.     • doxepin (SINEquan) 25 mg capsule Take 1 capsule (25 mg) by mouth once daily.     • Farxiga 10 mg TAKE 1 TABLET BY MOUTH EVERY DAY (Patient not taking: Reported on 4/15/2024) 90 tablet 3     Current Facility-Administered Medications  "  Medication Dose Route Frequency Provider Last Rate Last Admin   • benzonatate (Tessalon) capsule 200 mg  200 mg oral TID PRN Stan Payan MD           Allergies   Allergen Reactions   • Bee Venom Protein (Honey Bee) Anaphylaxis, Unknown and Other   • Sulfa (Sulfonamide Antibiotics) Rash, Unknown, Other and Anaphylaxis     SULFONAMIDE ANTIBIOTICS    angioedema, hives   • Bacitracin Swelling   • Clindamycin Other   • Fish Containing Products Unknown   • Nitrofurantoin Macrocrystal Unknown   • Oxycodone Other   • Shellfish Containing Products Unknown   • Tetracycline Swelling   • Nitrofurantoin Rash and Unknown     burning   • Penicillins Itching, Rash, Unknown and Other     When Young; tolerated ceftriaxone Dec 2022    angioedema, hives   • Promethazine Other and Rash     NECROTIC TISSUE TO HAND AFTER IV ADMINISTATION, PT. HAD TO HAVE SKIN GRAFTED         Visit Vitals  /76 (BP Location: Right arm, Patient Position: Sitting)   Pulse (!) 120   Ht 1.676 m (5' 6\")   Wt 71.2 kg (157 lb)   SpO2 97%   BMI 25.34 kg/m²   Smoking Status Never   BSA 1.82 m²        Physical Exam:  On exam Ms. Parker appears her stated age, is alert and oriented x3, and in no acute distress. Her sclera are anicteric and her oropharynx has moist mucous membranes. Her neck is supple and without thyromegaly. The JVP is ~6 cm of water above the right atrium. Her cardiac exam has regular rhythm, normal S1, S2. No S3/4. There are no murmurs. Her lungs are clear to auscultation bilaterally and there is no dullness to percussion. Her abdomen is soft, nontender with normoactive bowel sounds. There is no HJR. The extremities are warm and without edema. The skin is dry. There is no rash present. The distal pulses are 2+ in all four extremities. Her mood and affect are appropriate for todays encounter.      Cardiac Labs/Diagnostics:    Lab Results   Component Value Date    CREATININE 0.80 02/23/2023    BUN 18 02/23/2023     02/23/2023    K 4.2 " 02/23/2023     (H) 02/23/2023    CO2 26 02/23/2023        Recent Labs     12/08/20  0928 12/20/19  1020 05/02/19  1034   CHOL 106 110 105   LDLF 41 47 46   HDL 33.0* 45.0 35.0*   TRIG 161* 89 121       Recent Labs     03/16/20  2250   *     ECG (4/15/24):  Sinus tachycardia (), RBBB    ECG (4/18/23):  Sinus rhythm (HR 78), LAD     Echo (12/19/22):  1. Left ventricular systolic function is mildly to moderately decreased with a 35-40% estimated ejection fraction.  2. Entire apex is abnormal.  3. Overall mild to moderate regional LV systolic dysfunction with an apical wall motion abnormality. Would have benefitted from use of echocontrast to exclude LV apical thrombus given apical akinesis, however unable to obtain iv access.  4. Spectral Doppler shows an impaired relaxation pattern of left ventricular diastolic filling.  5. There is moderate mitral annular calcification.  6. RVSP within normal limits.  7. No prior echocardiogram available for comparison.     Cardiac cath (9/26/22):  1. Severe disease of a ramus branch.  2. Patent prior LAD stent.  3. Small, non-dominant RCA.  4. Normal LVEDP (13mmHg) with no AS.  5. Successful PCI of the ramus with 2.5 x 8 mm Resolute DEBBY.    Impression/Plan:  Ms. Parker is a 78F with a PMHx sig for CAD s/p PCI (LAD; 2014, [place]; 9/2022) and stage C systolic HF/ICM/HFrEF with moderate LV dysfunction who returns to the Advanced Heart Failure clinic for ongoing evaluation and management. At the current time she has functional class III symptoms and appears relatively euvolemic on exam.      1) Stage C acute on chronic systolic HF/ICM/HFrEF with moderate LV dysfunction (LVEF 35-40%; 12/2022)  Currently with significant dyspnea that is significantly affecting her QOL. She is currently on maximally tolerated GDMT; she stopped SGLT2. Unfortunately, cannot get CCM covered by her insurance. She will remain on her current medications.    -c/w metoprolol succinate 25 mg daily,  entresto 24/26 mg bid, spironolactone 25 mg daily       F/U: as needed at /[facility]       Malachi,  Thank you for referring MsFaith Keith to the  Advanced Heart Failure Clinic. Please let me know if you have any questions.      ____________________________________________________________  Miguel Leon DO  Section of Advanced Heart Failure and Cardiac Transplantation  Division of Cardiovascular Medicine  [facility]  [facility]

## 2024-04-15 NOTE — PATIENT INSTRUCTIONS
It was a pleasure seeing you today. Please contact myself or my team with any questions.     To reach Dr. Leon' office please call [phone] ([place]).   Fax: [phone]   To schedule an appointment call [phone]     If you have any questions or need cardiac medication refills, please call the Heart Failure office at [phone], option 6. You may also contact the  Heart Failure Nursing team via email at [email or web address] (Please include your name and date of birth).         1) Continue your current medications  2) Follow up as needed    Matthew Sandoval: [phone]

## 2024-04-15 NOTE — PROGRESS NOTES
[facility] Failure [facility]  Primary Care Physician: Nando Lombardi MD  Referring Provider/Cardiologist: Saba (/[facility])      Date of Visit: 04/15/2024  3:40 PM EDT  Location of visit: [address]     HPI:   Ms. Parker is a 78F with a PMHx sig for CAD s/p PCI (LAD; , [place]; 2022) and stage C systolic HF/ICM/HFrEF with moderate LV dysfunction who returns to the Advanced Heart Failure clinic for ongoing evaluation and management.       Interval hx:   Multiple attempts to get approval for CCM have failed; including most recently an appeal to the insurance medical director.     Currently denies chest pain. She has complaints of palpitations, shortness of breath, dyspnea on exertion, orthopnea, positive PND. No edema noted in BLE. She has complaints of headaches, dizziness. Denies recent falls.      Hospitalizations: Denies         PM/SHx:   CAD s/p PCI (LAD; , [place]; 2022), stage C systolic HF/ICM/HFrEF with moderate LV dysfunction      SocHx:   Lives in [place]   Denies smoking, ETOH, illicits     FamHx:   Father had  of cardiac arrest at the age of 62, Mother was 97 when she passed but had heart disease.        Current Outpatient Medications   Medication Sig Dispense Refill    albuterol 90 mcg/actuation aerosol powdr breath activated inhaler Inhale 2 puffs every 4 hours if needed.      alendronate (Fosamax) 70 mg tablet Take 1 tablet (70 mg) by mouth every 7 days. Take in the morning with a full glass of water, on an empty stomach, and do not take anything else by mouth or lie down for the next 30 min. 4 tablet 11    aspirin 81 mg EC tablet Take 1 tablet (81 mg) by mouth once daily.      benzonatate (Tessalon) 200 mg capsule Take 1 capsule (200 mg) by mouth 3 times a day as needed for cough. Do not crush or chew.      Entresto 24-26 mg tablet Take 1 tablet by mouth 2 times a day.      EPINEPHrine 0.3 mg/0.3 mL injection syringe Inject 0.3 mL (0.3 mg) as  directed if needed.      ibuprofen 600 mg tablet Take 1 tablet (600 mg) by mouth 3 times a day.      metoprolol succinate XL (Toprol-XL) 25 mg 24 hr tablet Take 1 tablet (25 mg) by mouth once daily.      mirtazapine (Remeron) 15 mg tablet Take 1 tablet (15 mg) by mouth once daily at bedtime.      montelukast (Singulair) 10 mg tablet Take 1 tablet (10 mg) by mouth once daily. 90 tablet 3    nitroglycerin (Nitrostat) 0.3 mg SL tablet Place 1 tablet (0.3 mg) under the tongue every 5 minutes if needed.      nystatin (Mycostatin) 100,000 unit/mL suspension Take 4 mL (400,000 Units) by mouth every 6 hours. For oral thrush      omeprazole (PriLOSEC) 40 mg DR capsule Take 1 capsule (40 mg) by mouth 2 times a day. 180 capsule 3    oxybutynin XL (Ditropan-XL) 10 mg 24 hr tablet TAKE 1 TABLET TWICE A  tablet 3    PARoxetine CR (Paxil-CR) 25 mg 24 hr tablet Take 1 tablet (25 mg) by mouth once daily.      rosuvastatin (Crestor) 20 mg tablet Take 1 tablet (20 mg) by mouth once daily.      spironolactone (Aldactone) 25 mg tablet Take 1 tablet (25 mg) by mouth once daily.      citalopram (CeleXA) 20 mg tablet Take 1 tablet (20 mg) by mouth once daily.      doxepin (SINEquan) 25 mg capsule Take 1 capsule (25 mg) by mouth once daily.      Farxiga 10 mg TAKE 1 TABLET BY MOUTH EVERY DAY (Patient not taking: Reported on 4/15/2024) 90 tablet 3     Current Facility-Administered Medications   Medication Dose Route Frequency Provider Last Rate Last Admin    benzonatate (Tessalon) capsule 200 mg  200 mg oral TID PRN Stan Payan MD           Allergies   Allergen Reactions    Bee Venom Protein (Honey Bee) Anaphylaxis, Unknown and Other    Sulfa (Sulfonamide Antibiotics) Rash, Unknown, Other and Anaphylaxis     SULFONAMIDE ANTIBIOTICS    angioedema, hives    Bacitracin Swelling    Clindamycin Other    Fish Containing Products Unknown    Nitrofurantoin Macrocrystal Unknown    Oxycodone Other    Shellfish Containing Products Unknown     "Tetracycline Swelling    Nitrofurantoin Rash and Unknown     burning    Penicillins Itching, Rash, Unknown and Other     When Young; tolerated ceftriaxone Dec 2022    angioedema, hives    Promethazine Other and Rash     NECROTIC TISSUE TO HAND AFTER IV ADMINISTATION, PT. HAD TO HAVE SKIN GRAFTED         Visit Vitals  /76 (BP Location: Right arm, Patient Position: Sitting)   Pulse (!) 120   Ht 1.676 m (5' 6\")   Wt 71.2 kg (157 lb)   SpO2 97%   BMI 25.34 kg/m²   Smoking Status Never   BSA 1.82 m²        Physical Exam:  On exam Ms. Parker appears her stated age, is alert and oriented x3, and in no acute distress. Her sclera are anicteric and her oropharynx has moist mucous membranes. Her neck is supple and without thyromegaly. The JVP is ~6 cm of water above the right atrium. Her cardiac exam has regular rhythm, normal S1, S2. No S3/4. There are no murmurs. Her lungs are clear to auscultation bilaterally and there is no dullness to percussion. Her abdomen is soft, nontender with normoactive bowel sounds. There is no HJR. The extremities are warm and without edema. The skin is dry. There is no rash present. The distal pulses are 2+ in all four extremities. Her mood and affect are appropriate for todays encounter.      Cardiac Labs/Diagnostics:    Lab Results   Component Value Date    CREATININE 0.80 02/23/2023    BUN 18 02/23/2023     02/23/2023    K 4.2 02/23/2023     (H) 02/23/2023    CO2 26 02/23/2023        Recent Labs     12/08/20  0928 12/20/19  1020 05/02/19  1034   CHOL 106 110 105   LDLF 41 47 46   HDL 33.0* 45.0 35.0*   TRIG 161* 89 121       Recent Labs     03/16/20  2250   *     ECG (4/15/24):  Sinus tachycardia (), RBBB    ECG (4/18/23):  Sinus rhythm (HR 78), LAD     Echo (12/19/22):  1. Left ventricular systolic function is mildly to moderately decreased with a 35-40% estimated ejection fraction.  2. Entire apex is abnormal.  3. Overall mild to moderate regional LV systolic " dysfunction with an apical wall motion abnormality. Would have benefitted from use of echocontrast to exclude LV apical thrombus given apical akinesis, however unable to obtain iv access.  4. Spectral Doppler shows an impaired relaxation pattern of left ventricular diastolic filling.  5. There is moderate mitral annular calcification.  6. RVSP within normal limits.  7. No prior echocardiogram available for comparison.     Cardiac cath (9/26/22):  1. Severe disease of a ramus branch.  2. Patent prior LAD stent.  3. Small, non-dominant RCA.  4. Normal LVEDP (13mmHg) with no AS.  5. Successful PCI of the ramus with 2.5 x 8 mm Resolute DEBBY.    Impression/Plan:  Ms. Parker is a 78F with a PMHx sig for CAD s/p PCI (LAD; 2014, [place]; 9/2022) and stage C systolic HF/ICM/HFrEF with moderate LV dysfunction who returns to the Advanced Heart Failure clinic for ongoing evaluation and management. At the current time she has functional class III symptoms and appears relatively euvolemic on exam.      1) Stage C acute on chronic systolic HF/ICM/HFrEF with moderate LV dysfunction (LVEF 35-40%; 12/2022)  Currently with significant dyspnea that is significantly affecting her QOL. She is currently on maximally tolerated GDMT; she stopped SGLT2. Unfortunately, cannot get CCM covered by her insurance. She will remain on her current medications.    -c/w metoprolol succinate 25 mg daily, entresto 24/26 mg bid, spironolactone 25 mg daily       F/U: as needed at /[facility]       Malachi  Thank you for referring Ms. Parker to the  Advanced Heart Failure Clinic. Please let me know if you have any questions.      ____________________________________________________________  Miguel Leon DO  Section of Advanced Heart Failure and Cardiac Transplantation  Division of Cardiovascular Medicine  [facility]  [facility]

## 2024-04-18 DIAGNOSIS — R05.3 CHRONIC COUGH: Primary | ICD-10-CM

## 2024-04-18 RX ORDER — ALBUTEROL SULFATE 90 UG/1
2 AEROSOL, METERED RESPIRATORY (INHALATION) EVERY 6 HOURS PRN
Qty: 18 G | Refills: 11 | Status: SHIPPED | OUTPATIENT
Start: 2024-04-18 | End: 2025-04-18

## 2024-04-19 DIAGNOSIS — I50.22 CHRONIC SYSTOLIC (CONGESTIVE) HEART FAILURE (MULTI): ICD-10-CM

## 2024-04-22 RX ORDER — SACUBITRIL AND VALSARTAN 24; 26 MG/1; MG/1
1 TABLET, FILM COATED ORAL 2 TIMES DAILY
Qty: 60 TABLET | Refills: 11 | Status: SHIPPED | OUTPATIENT
Start: 2024-04-22

## 2024-05-05 DIAGNOSIS — K21.9 GASTROESOPHAGEAL REFLUX DISEASE, UNSPECIFIED WHETHER ESOPHAGITIS PRESENT: ICD-10-CM

## 2024-05-05 DIAGNOSIS — J45.909 ASTHMA, UNSPECIFIED ASTHMA SEVERITY, UNSPECIFIED WHETHER COMPLICATED, UNSPECIFIED WHETHER PERSISTENT (HHS-HCC): ICD-10-CM

## 2024-05-06 ENCOUNTER — TELEPHONE (OUTPATIENT)
Dept: OTOLARYNGOLOGY | Facility: CLINIC | Age: 79
End: 2024-05-06
Payer: MEDICARE

## 2024-05-06 DIAGNOSIS — B37.0 THRUSH: Primary | ICD-10-CM

## 2024-05-06 RX ORDER — NYSTATIN 100000 [USP'U]/ML
5 SUSPENSION ORAL 3 TIMES DAILY
Qty: 300 ML | Refills: 1 | Status: SHIPPED | OUTPATIENT
Start: 2024-05-06

## 2024-05-06 RX ORDER — MONTELUKAST SODIUM 10 MG/1
10 TABLET ORAL DAILY
Qty: 90 TABLET | Refills: 3 | Status: SHIPPED | OUTPATIENT
Start: 2024-05-06

## 2024-05-06 RX ORDER — OMEPRAZOLE 40 MG/1
40 CAPSULE, DELAYED RELEASE ORAL 2 TIMES DAILY
Qty: 180 CAPSULE | Refills: 3 | Status: SHIPPED | OUTPATIENT
Start: 2024-05-06

## 2024-05-28 ENCOUNTER — OFFICE VISIT (OUTPATIENT)
Dept: PRIMARY CARE | Facility: CLINIC | Age: 79
End: 2024-05-28
Payer: MEDICARE

## 2024-05-28 VITALS
WEIGHT: 161 LBS | BODY MASS INDEX: 25.88 KG/M2 | HEART RATE: 67 BPM | DIASTOLIC BLOOD PRESSURE: 80 MMHG | HEIGHT: 66 IN | OXYGEN SATURATION: 98 % | SYSTOLIC BLOOD PRESSURE: 130 MMHG | RESPIRATION RATE: 14 BRPM

## 2024-05-28 DIAGNOSIS — I10 BENIGN ESSENTIAL HYPERTENSION: ICD-10-CM

## 2024-05-28 DIAGNOSIS — M15.9 GENERALIZED OSTEOARTHRITIS: ICD-10-CM

## 2024-05-28 DIAGNOSIS — I50.22 CHRONIC SYSTOLIC HEART FAILURE (MULTI): Primary | ICD-10-CM

## 2024-05-28 PROCEDURE — 3079F DIAST BP 80-89 MM HG: CPT | Performed by: INTERNAL MEDICINE

## 2024-05-28 PROCEDURE — 99213 OFFICE O/P EST LOW 20 MIN: CPT | Performed by: INTERNAL MEDICINE

## 2024-05-28 PROCEDURE — 1160F RVW MEDS BY RX/DR IN RCRD: CPT | Performed by: INTERNAL MEDICINE

## 2024-05-28 PROCEDURE — 1159F MED LIST DOCD IN RCRD: CPT | Performed by: INTERNAL MEDICINE

## 2024-05-28 PROCEDURE — 3075F SYST BP GE 130 - 139MM HG: CPT | Performed by: INTERNAL MEDICINE

## 2024-05-28 PROCEDURE — 1157F ADVNC CARE PLAN IN RCRD: CPT | Performed by: INTERNAL MEDICINE

## 2024-05-28 RX ORDER — CIPROFLOXACIN 500 MG/1
500 TABLET ORAL DAILY PRN
Qty: 14 TABLET | Refills: 0 | Status: SHIPPED | OUTPATIENT
Start: 2024-05-28

## 2024-05-28 NOTE — PROGRESS NOTES
"Subjective   Gianna Parker is a 78 y.o. female who presents for FOLLOW UP     HPI   DOING OK, FEEL A LOT OF CONGESTION/ALLERGIES    FEEL LIKE NOSE AND THROAT    GETTING DENTAL WORK, NEED TAKE CIPRO PROPHYLACTICALLY FOR DENTAL WORK.    CARDIOLOGIST STILL WORKING ON GETTING AN NEW CARDIAC IMPLANT.  CARDIAC CONTRACTILITY MODULATION      Review of Systems   Constitutional:  Negative for chills, diaphoresis and fever.   Respiratory:  Negative for cough and shortness of breath.    Cardiovascular:  Negative for chest pain and leg swelling.   Gastrointestinal:  Negative for constipation, diarrhea, nausea and vomiting.   Musculoskeletal:  Negative for joint swelling and myalgias.       Objective   /80   Pulse 67   Resp 14   Ht 1.676 m (5' 6\")   Wt 73 kg (161 lb)   SpO2 98%   BMI 25.99 kg/m²     Physical Exam  Vitals reviewed.   Constitutional:       General: She is not in acute distress.     Appearance: She is not ill-appearing.   Cardiovascular:      Rate and Rhythm: Normal rate and regular rhythm.      Pulses: Normal pulses.      Heart sounds:      No gallop.   Pulmonary:      Breath sounds: Normal breath sounds. No wheezing, rhonchi or rales.   Abdominal:      General: Abdomen is flat. Bowel sounds are normal.      Palpations: Abdomen is soft.      Tenderness: There is no guarding or rebound.   Musculoskeletal:      Right lower leg: No edema.      Left lower leg: No edema.         Assessment/Plan   Problem List Items Addressed This Visit       Benign essential hypertension    Generalized osteoarthritis    Chronic systolic heart failure (Multi) - Primary    Relevant Medications    ciprofloxacin (Cipro) 500 mg tablet     Patient Instructions    TRY NON-SEDATING ANTIHISTAMINE FOR ALLERGIES (CLARITIN OR ALLEGRA).  JUST MAKE SURE THERE IS NO -D (DECONGESTANT) AS DECONGESTANTS DON'T MIX WELL WITH HEART.    2.  CIPRO 500 MG TAKE ONCE DAILY AS NEEDED FOR DENTAL WORK FOR HEART PREVENTION, I SENT SCRIPT IN FOR " YOU    3.  FASTING LABS ARE STILL ORDERED FOR YOU    4.  PLEASE CALL IF REFILLS ARE NEEDED

## 2024-05-28 NOTE — PATIENT INSTRUCTIONS
TRY NON-SEDATING ANTIHISTAMINE FOR ALLERGIES (CLARITIN OR ALLEGRA).  JUST MAKE SURE THERE IS NO -D (DECONGESTANT) AS DECONGESTANTS DON'T MIX WELL WITH HEART.    2.  CIPRO 500 MG TAKE ONCE DAILY AS NEEDED FOR DENTAL WORK FOR HEART PREVENTION, I SENT SCRIPT IN FOR YOU    3.  FASTING LABS ARE STILL ORDERED FOR YOU    4.  PLEASE CALL IF REFILLS ARE NEEDED

## 2024-05-30 ASSESSMENT — ENCOUNTER SYMPTOMS
COUGH: 0
MYALGIAS: 0
FEVER: 0
DIAPHORESIS: 0
JOINT SWELLING: 0
VOMITING: 0
CONSTIPATION: 0
CHILLS: 0
DIARRHEA: 0
NAUSEA: 0
SHORTNESS OF BREATH: 0

## 2024-05-31 ENCOUNTER — OFFICE VISIT (OUTPATIENT)
Dept: CARDIOLOGY | Facility: CLINIC | Age: 79
End: 2024-05-31
Payer: MEDICARE

## 2024-05-31 VITALS
BODY MASS INDEX: 25.71 KG/M2 | HEART RATE: 76 BPM | WEIGHT: 160 LBS | DIASTOLIC BLOOD PRESSURE: 60 MMHG | HEIGHT: 66 IN | SYSTOLIC BLOOD PRESSURE: 110 MMHG

## 2024-05-31 DIAGNOSIS — I50.22 CHRONIC SYSTOLIC HEART FAILURE (MULTI): Primary | ICD-10-CM

## 2024-05-31 DIAGNOSIS — Z78.9 NEVER SMOKED CIGARETTES: ICD-10-CM

## 2024-05-31 DIAGNOSIS — I10 BENIGN ESSENTIAL HYPERTENSION: ICD-10-CM

## 2024-05-31 DIAGNOSIS — I25.119 CORONARY ARTERY DISEASE INVOLVING NATIVE CORONARY ARTERY OF NATIVE HEART WITH ANGINA PECTORIS (CMS-HCC): ICD-10-CM

## 2024-05-31 DIAGNOSIS — Z71.89 ENCOUNTER TO DISCUSS TREATMENT OPTIONS: ICD-10-CM

## 2024-05-31 DIAGNOSIS — I25.5 CARDIOMYOPATHY, ISCHEMIC: ICD-10-CM

## 2024-05-31 DIAGNOSIS — Z71.89 ENCOUNTER FOR MEDICATION REVIEW AND COUNSELING: ICD-10-CM

## 2024-05-31 DIAGNOSIS — R06.02 SHORTNESS OF BREATH: ICD-10-CM

## 2024-05-31 PROCEDURE — 1157F ADVNC CARE PLAN IN RCRD: CPT | Performed by: INTERNAL MEDICINE

## 2024-05-31 PROCEDURE — 93000 ELECTROCARDIOGRAM COMPLETE: CPT | Performed by: INTERNAL MEDICINE

## 2024-05-31 PROCEDURE — 1160F RVW MEDS BY RX/DR IN RCRD: CPT | Performed by: INTERNAL MEDICINE

## 2024-05-31 PROCEDURE — 1159F MED LIST DOCD IN RCRD: CPT | Performed by: INTERNAL MEDICINE

## 2024-05-31 PROCEDURE — 1036F TOBACCO NON-USER: CPT | Performed by: INTERNAL MEDICINE

## 2024-05-31 PROCEDURE — 3074F SYST BP LT 130 MM HG: CPT | Performed by: INTERNAL MEDICINE

## 2024-05-31 PROCEDURE — 3078F DIAST BP <80 MM HG: CPT | Performed by: INTERNAL MEDICINE

## 2024-05-31 PROCEDURE — 99215 OFFICE O/P EST HI 40 MIN: CPT | Performed by: INTERNAL MEDICINE

## 2024-05-31 RX ORDER — OXYBUTYNIN CHLORIDE 15 MG/1
15 TABLET, EXTENDED RELEASE ORAL DAILY
COMMUNITY

## 2024-05-31 RX ORDER — IPRATROPIUM BROMIDE 42 UG/1
2 SPRAY, METERED NASAL 4 TIMES DAILY
COMMUNITY

## 2024-05-31 RX ORDER — MEMANTINE HYDROCHLORIDE 10 MG/1
10 TABLET ORAL 2 TIMES DAILY
COMMUNITY

## 2024-05-31 RX ORDER — PAROXETINE 30 MG/1
30 TABLET, FILM COATED ORAL EVERY MORNING
COMMUNITY

## 2024-05-31 ASSESSMENT — ENCOUNTER SYMPTOMS
WHEEZING: 1
DYSPNEA ON EXERTION: 1
PALPITATIONS: 0
SHORTNESS OF BREATH: 1

## 2024-05-31 NOTE — PROGRESS NOTES
"Chief Complaint:   Follow-up (Pt requested device implantation)     History Of Present Illness:    Gianna Parker is a 78 y.o. female presenting with follow-up.  She is still awaiting approval from insurance for [employer] CCM and check to leave the device.  We discussed that all the communication that I have reviewed and the appeal and phone call that I performed have not approved the device  She reports that she heard about possible clinical trials at [facility].  She discussed that the [employer] representative is helping her.  We reviewed the last communication from [employer] thakkar for appeal status Case #[id number] for CCM device.    She is short of breath with activities of daily living.  We reviewed that her last echo was in 2022.    She is wheezing.  She has not used an inhaler recently.  I recommend she follow-up with Dr. Lombardi regarding the    Last Recorded Vitals:  Vitals:    05/31/24 1017   BP: 110/60   BP Location: Left arm   Patient Position: Sitting   Pulse: 76   Weight: 72.6 kg (160 lb)   Height: 1.676 m (5' 6\")       Past Medical History:  See List     Past Surgical History:  See List       Social History:  She reports that she has never smoked. She has never used smokeless tobacco. She reports that she does not currently use alcohol. She reports that she does not use drugs.    Family History:  Family History   Problem Relation Name Age of Onset    Coronary artery disease Mother      Coronary artery disease Father      Coronary artery disease Other sibling         Allergies:  Bee venom protein (honey bee), Sulfa (sulfonamide antibiotics), Bacitracin, Clindamycin, Fish containing products, Nitrofurantoin macrocrystal, Oxycodone, Shellfish containing products, Tetracycline, Nitrofurantoin, Penicillins, and Promethazine    Outpatient Medications:  Current Outpatient Medications   Medication Instructions    albuterol (Ventolin HFA) 90 mcg/actuation inhaler 2 puffs, inhalation, " Every 6 hours PRN    alendronate (FOSAMAX) 70 mg, oral, Every 7 days, Take in the morning with a full glass of water, on an empty stomach, and do not take anything else by mouth or lie down for the next 30 min.    aspirin 81 mg EC tablet 1 tablet, oral, Daily    benzonatate (TESSALON) 200 mg, oral, 3 times daily PRN, Do not crush or chew.    ciprofloxacin (CIPRO) 500 mg, oral, Daily PRN    Entresto 24-26 mg tablet 1 tablet, oral, 2 times daily    EPINEPHrine 0.3 mg/0.3 mL injection syringe 1 Syringe, injection, As needed    ibuprofen 600 mg tablet 1 tablet, oral, 3 times daily, PRN    ipratropium (Atrovent) 42 mcg (0.06 %) nasal spray 2 sprays, Each Nostril, 4 times daily    memantine (NAMENDA) 10 mg, oral, 2 times daily    metoprolol succinate XL (TOPROL-XL) 25 mg, oral, Daily    mirtazapine (Remeron) 15 mg tablet 1 tablet, oral, Nightly    montelukast (SINGULAIR) 10 mg, oral, Daily    nitroglycerin (Nitrostat) 0.3 mg SL tablet 1 tablet, sublingual, Every 5 min PRN    nystatin (MYCOSTATIN) 500,000 Units, oral, 3 times daily, For oral thrush    omeprazole (PRILOSEC) 40 mg, oral, 2 times daily    oxybutynin XL (DITROPAN-XL) 15 mg, oral, Daily, Do not crush, chew, or split.    PARoxetine (PAXIL) 30 mg, oral, Every morning    rosuvastatin (CRESTOR) 20 mg, oral, Daily    spironolactone (Aldactone) 25 mg tablet 1 tablet, oral, Daily   Review of Systems   Cardiovascular:  Positive for dyspnea on exertion. Negative for chest pain and palpitations.   Respiratory:  Positive for shortness of breath and wheezing.    All other systems reviewed and are negative.        Physical Exam:  Constitutional:       General: Awake.      Appearance: Normal and healthy appearance. Well-developed and not in distress.   Neck:      Vascular: No JVR. JVD normal.   Pulmonary:      Effort: Pulmonary effort is normal.      Breath sounds: Wheezing present. No rhonchi. No rales.   Chest:      Chest wall: Not tender to palpatation.   Cardiovascular:       PMI at left midclavicular line. Normal rate. Regular rhythm. Normal S1. Normal S2.       Murmurs: There is no murmur.      No gallop.  No click. No rub.   Pulses:     Intact distal pulses.   Edema:     Peripheral edema absent.   Abdominal:      Tenderness: There is no abdominal tenderness.   Musculoskeletal: Normal range of motion.         General: No tenderness. Skin:     General: Skin is warm and dry.   Neurological:      General: No focal deficit present.      Mental Status: Alert and oriented to person, place and time.            Last Labs:  CBC -  Lab Results   Component Value Date    WBC 18.2 (H) 12/29/2022    HGB 8.4 (L) 12/29/2022    HCT 27.3 (L) 12/29/2022    MCV 82 12/29/2022     12/29/2022       CMP -  Lab Results   Component Value Date    CALCIUM 9.6 02/23/2023    PHOS 1.8 (L) 12/29/2022    PROT 7.6 01/12/2023    ALBUMIN 4.2 01/12/2023    AST 13 01/12/2023    ALT 12 01/12/2023    ALKPHOS 100 01/12/2023    BILITOT 0.8 01/12/2023       LIPID PANEL -   Lab Results   Component Value Date    CHOL 106 12/08/2020    TRIG 161 (H) 12/08/2020    HDL 33.0 (A) 12/08/2020    CHHDL 3.2 12/08/2020    LDLF 41 12/08/2020    VLDL 32 12/08/2020       RENAL FUNCTION PANEL -   Lab Results   Component Value Date    GLUCOSE 99 02/23/2023     02/23/2023    K 4.2 02/23/2023     (H) 02/23/2023    CO2 26 02/23/2023    ANIONGAP 8 (L) 02/23/2023    BUN 18 02/23/2023    CREATININE 0.80 02/23/2023    CALCIUM 9.6 02/23/2023    PHOS 1.8 (L) 12/29/2022    ALBUMIN 4.2 01/12/2023        Lab Results   Component Value Date     (H) 03/16/2020    HGBA1C 6.6 12/20/2019       Last Cardiology Tests:  ECG:  ECG 12 Lead 04/15/2024    Today.  Normal sinus rhythm.  Right axis deviation.  LVH.  Right bundle branch block pattern / IVCD.  Corrected QT interval 430 ms.  QRS interval 128 ms    Lab review: I have personally reviewed the laboratory result(s) see above    Assessment/Plan   Diagnoses and all orders for this  visit:  Chronic systolic heart failure (Multi)  Coronary artery disease involving native coronary artery of native heart with angina pectoris (CMS-[facility])  Benign essential hypertension  Cardiomyopathy, ischemic  BMI 25.0-25.9,adult  Never smoked cigarettes  Encounter for medication review and counseling  Encounter to discuss treatment options        Rashmi Simmons RN    Ischemic cardiomyopathy ejection fraction 35 to 40% in 2022. Chronic systolic heart failure. NYHA IIIc heart failure.  Now with right bundle branch/IVCD with  ms.  On optimal heart failure medications.  Shared decision making with patient and  last year for CCM Ventricular device and with patient today in office. Also  reviewed data with Dr. Leon over the past year through each stage of review for CMM device. The device has not been approved over the past year despite multiple measures and including appeal and discussion have been made on behalf of patient.  Most recent communication from [employer] reports appeal is in review for case #[id number].  Reviewed Dr. Walter's office notes as well.  Will reorder echo to reevaluate LVEF.  Order placed.  Coronary artery disease. Status post PCI stents. Most recent PCI stent rhythm 2022     AHA recommendations for exercise, diet, and behavioral modification reviewed with pt.     The patient and I discussed the mechanism of arrhythmia, CHF, ejection fraction, last echocardiogram, CCM August versus defibrillator versus pacemaker, [place] decision tool, preoperative cardiac evaluation, indications for and types of medications, discussion if and what medication refills needed, treatment options, risks, benefits, and imponderables. American Heart Association lifestyle changes and behavioral modification discussed. All questions answered in detail. Counseling over 50% visit regarding above. Patient appreciative of care.  Also discussed above with Dr. Leon after today's office visit.

## 2024-05-31 NOTE — PATIENT INSTRUCTIONS
Continue same medications/treatment.  Patient educated on proper medication use.  Patient educated on risk factor modification.  Please bring any lab results from other providers/physicians to your next appointment.    Please bring all medicines, vitamins, and herbal supplements with you when you come to the office.    Prescriptions will not be filled unless you are compliant with your follow up appointments or have a follow up appointment scheduled as per instruction of your physician. Refills should be requested at the time of your visit.    Follow up with Dr. Ferrer in 1 year    I, DAVID DUTTA RN, AM SCRIBING FOR AND IN THE PRESENCE OF DR. KARSTEN FERRER MD, FACC, FACP, RS

## 2024-07-03 ENCOUNTER — TELEPHONE (OUTPATIENT)
Dept: OTOLARYNGOLOGY | Facility: CLINIC | Age: 79
End: 2024-07-03
Payer: MEDICARE

## 2024-07-03 DIAGNOSIS — B37.0 THRUSH: ICD-10-CM

## 2024-07-03 RX ORDER — NYSTATIN 100000 [USP'U]/ML
5 SUSPENSION ORAL 3 TIMES DAILY
Qty: 300 ML | Refills: 1 | Status: SHIPPED | OUTPATIENT
Start: 2024-07-03

## 2024-07-03 RX ORDER — EPINEPHRINE 0.3 MG/.3ML
1 INJECTION SUBCUTANEOUS AS NEEDED
Qty: 0.3 EACH | Refills: 0 | Status: SHIPPED | OUTPATIENT
Start: 2024-07-03

## 2024-07-16 ENCOUNTER — TELEPHONE (OUTPATIENT)
Dept: CARDIOLOGY | Facility: CLINIC | Age: 79
End: 2024-07-16
Payer: MEDICARE

## 2024-07-16 NOTE — TELEPHONE ENCOUNTER
Patient called and stated she needs to have an implant and insurance keeps denying it. She stated the insurance has received an appeal request but no answer yet from the appeal. Patient stated she would like to speak to Dr. Margaret García MD, FACC, FACP, RS nurse about how to move forward. Routed to Rashmi Simmons RN

## 2024-07-24 ENCOUNTER — HOSPITAL ENCOUNTER (OUTPATIENT)
Dept: RADIOLOGY | Facility: EXTERNAL LOCATION | Age: 79
Discharge: HOME | End: 2024-07-24

## 2024-07-24 DIAGNOSIS — M54.2 CERVICAL SPINE PAIN: ICD-10-CM

## 2024-07-30 DIAGNOSIS — I50.22 CHRONIC SYSTOLIC (CONGESTIVE) HEART FAILURE (MULTI): ICD-10-CM

## 2024-07-31 RX ORDER — SPIRONOLACTONE 25 MG/1
12.5 TABLET ORAL DAILY
Qty: 45 TABLET | Refills: 3 | Status: SHIPPED | OUTPATIENT
Start: 2024-07-31

## 2024-08-05 DIAGNOSIS — R39.15 URINARY URGENCY: Primary | ICD-10-CM

## 2024-08-05 RX ORDER — OXYBUTYNIN CHLORIDE 15 MG/1
15 TABLET, EXTENDED RELEASE ORAL DAILY
Qty: 90 TABLET | Refills: 3 | Status: SHIPPED | OUTPATIENT
Start: 2024-08-05

## 2024-08-09 ENCOUNTER — LAB (OUTPATIENT)
Dept: LAB | Facility: LAB | Age: 79
End: 2024-08-09
Payer: MEDICARE

## 2024-08-09 DIAGNOSIS — E53.8 LOW SERUM VITAMIN B12: ICD-10-CM

## 2024-08-09 DIAGNOSIS — E78.00 HYPERCHOLESTEROLEMIA: ICD-10-CM

## 2024-08-09 DIAGNOSIS — E55.9 MILD VITAMIN D DEFICIENCY: ICD-10-CM

## 2024-08-09 DIAGNOSIS — D64.9 ANEMIA, UNSPECIFIED TYPE: ICD-10-CM

## 2024-08-09 DIAGNOSIS — D64.9 ANEMIA, UNSPECIFIED TYPE: Primary | ICD-10-CM

## 2024-08-09 DIAGNOSIS — E53.8 VITAMIN B 12 DEFICIENCY: ICD-10-CM

## 2024-08-09 LAB
25(OH)D3 SERPL-MCNC: 22 NG/ML (ref 30–100)
ALBUMIN SERPL BCP-MCNC: 3.7 G/DL (ref 3.4–5)
ALP SERPL-CCNC: 100 U/L (ref 33–136)
ALT SERPL W P-5'-P-CCNC: 10 U/L (ref 7–45)
ANION GAP SERPL CALC-SCNC: 11 MMOL/L (ref 10–20)
AST SERPL W P-5'-P-CCNC: 12 U/L (ref 9–39)
BILIRUB SERPL-MCNC: 0.6 MG/DL (ref 0–1.2)
BUN SERPL-MCNC: 18 MG/DL (ref 6–23)
CALCIUM SERPL-MCNC: 10.2 MG/DL (ref 8.6–10.3)
CHLORIDE SERPL-SCNC: 110 MMOL/L (ref 98–107)
CHOLEST SERPL-MCNC: 196 MG/DL (ref 0–199)
CHOLESTEROL/HDL RATIO: 4.9
CO2 SERPL-SCNC: 24 MMOL/L (ref 21–32)
CREAT SERPL-MCNC: 0.8 MG/DL (ref 0.5–1.05)
EGFRCR SERPLBLD CKD-EPI 2021: 75 ML/MIN/1.73M*2
ERYTHROCYTE [DISTWIDTH] IN BLOOD BY AUTOMATED COUNT: 18.7 % (ref 11.5–14.5)
GLUCOSE SERPL-MCNC: 99 MG/DL (ref 74–99)
HCT VFR BLD AUTO: 31.1 % (ref 36–46)
HDLC SERPL-MCNC: 39.6 MG/DL
HGB BLD-MCNC: 9.5 G/DL (ref 12–16)
LDLC SERPL CALC-MCNC: 137 MG/DL
MCH RBC QN AUTO: 24.2 PG (ref 26–34)
MCHC RBC AUTO-ENTMCNC: 30.5 G/DL (ref 32–36)
MCV RBC AUTO: 79 FL (ref 80–100)
NON HDL CHOLESTEROL: 156 MG/DL (ref 0–149)
NRBC BLD-RTO: 0 /100 WBCS (ref 0–0)
PLATELET # BLD AUTO: 339 X10*3/UL (ref 150–450)
POTASSIUM SERPL-SCNC: 4.7 MMOL/L (ref 3.5–5.3)
PROT SERPL-MCNC: 6.7 G/DL (ref 6.4–8.2)
RBC # BLD AUTO: 3.92 X10*6/UL (ref 4–5.2)
SODIUM SERPL-SCNC: 140 MMOL/L (ref 136–145)
TRIGL SERPL-MCNC: 98 MG/DL (ref 0–149)
TSH SERPL-ACNC: 1.97 MIU/L (ref 0.44–3.98)
VIT B12 SERPL-MCNC: 214 PG/ML (ref 211–911)
VLDL: 20 MG/DL (ref 0–40)
WBC # BLD AUTO: 5.4 X10*3/UL (ref 4.4–11.3)

## 2024-08-09 PROCEDURE — 80061 LIPID PANEL: CPT

## 2024-08-09 PROCEDURE — 36415 COLL VENOUS BLD VENIPUNCTURE: CPT

## 2024-08-09 PROCEDURE — 80053 COMPREHEN METABOLIC PANEL: CPT

## 2024-08-09 PROCEDURE — 83921 ORGANIC ACID SINGLE QUANT: CPT

## 2024-08-09 PROCEDURE — 84443 ASSAY THYROID STIM HORMONE: CPT

## 2024-08-09 PROCEDURE — 82607 VITAMIN B-12: CPT

## 2024-08-09 PROCEDURE — 82306 VITAMIN D 25 HYDROXY: CPT

## 2024-08-09 PROCEDURE — 85027 COMPLETE CBC AUTOMATED: CPT

## 2024-08-15 LAB — METHYLMALONATE SERPL-SCNC: 0.2 UMOL/L (ref 0–0.4)

## 2024-08-27 ENCOUNTER — APPOINTMENT (OUTPATIENT)
Dept: CARDIOLOGY | Facility: CLINIC | Age: 79
End: 2024-08-27
Payer: MEDICARE

## 2024-08-27 VITALS
SYSTOLIC BLOOD PRESSURE: 123 MMHG | DIASTOLIC BLOOD PRESSURE: 73 MMHG | BODY MASS INDEX: 25.71 KG/M2 | WEIGHT: 160 LBS | HEIGHT: 66 IN | OXYGEN SATURATION: 97 % | HEART RATE: 94 BPM

## 2024-08-27 DIAGNOSIS — I50.20 ACC/AHA STAGE C SYSTOLIC HEART FAILURE (MULTI): Primary | ICD-10-CM

## 2024-08-27 DIAGNOSIS — I25.5 ISCHEMIC CARDIOMYOPATHY: ICD-10-CM

## 2024-08-27 PROCEDURE — 1159F MED LIST DOCD IN RCRD: CPT | Performed by: INTERNAL MEDICINE

## 2024-08-27 PROCEDURE — 1157F ADVNC CARE PLAN IN RCRD: CPT | Performed by: INTERNAL MEDICINE

## 2024-08-27 PROCEDURE — 1036F TOBACCO NON-USER: CPT | Performed by: INTERNAL MEDICINE

## 2024-08-27 PROCEDURE — 3074F SYST BP LT 130 MM HG: CPT | Performed by: INTERNAL MEDICINE

## 2024-08-27 PROCEDURE — 99213 OFFICE O/P EST LOW 20 MIN: CPT | Performed by: INTERNAL MEDICINE

## 2024-08-27 PROCEDURE — 3078F DIAST BP <80 MM HG: CPT | Performed by: INTERNAL MEDICINE

## 2024-08-27 NOTE — PROGRESS NOTES
[facility]  Primary Care Physician: Nando Lombardi MD  Referring Provider/Cardiologist: Saba (/[facility])      Date of Visit: 2024  4:20 PM EDT  Location of visit: [address]     HPI:   Ms. Parker is a 78F with a PMHx sig for CAD s/p PCI (LAD; , [place]; 2022) and stage C systolic HF/ICM/HFrEF with moderate LV dysfunction who returns to the Advanced Heart Failure clinic for ongoing evaluation and management.       Interval hx:   Multiple attempts to get approval for CCM have failed; including most recently an appeal to the insurance medical director.     She continues to experience intermittent dyspnea, palpitations, and dizziness. Overall she feels like her symptoms are becoming more frequent and severe in nature.    Hospitalizations: Denies         PM/SHx:   CAD s/p PCI (LAD; , [place]; 2022), stage C systolic HF/ICM/HFrEF with moderate LV dysfunction      SocHx:   Lives in [place]   Denies smoking, ETOH, illicits     FamHx:   Father had  of cardiac arrest at the age of 62, Mother was 97 when she passed but had heart disease.        Current Outpatient Medications   Medication Sig Dispense Refill    albuterol (Ventolin HFA) 90 mcg/actuation inhaler Inhale 2 puffs every 6 hours if needed for wheezing. 18 g 11    alendronate (Fosamax) 70 mg tablet Take 1 tablet (70 mg) by mouth every 7 days. Take in the morning with a full glass of water, on an empty stomach, and do not take anything else by mouth or lie down for the next 30 min. 4 tablet 11    aspirin 81 mg EC tablet Take 1 tablet (81 mg) by mouth once daily.      benzonatate (Tessalon) 200 mg capsule Take 1 capsule (200 mg) by mouth 3 times a day as needed for cough. Do not crush or chew.      ciprofloxacin (Cipro) 500 mg tablet Take 1 tablet (500 mg) by mouth once daily as needed (DENTAL WORK FOR HEART PROPHYKLAXIS). 14 tablet 0    Entresto 24-26 mg tablet TAKE 1 TABLET TWICE A DAY 60  tablet 11    EPINEPHrine 0.3 mg/0.3 mL injection syringe Inject 0.3 mL (0.3 mg) into the muscle if needed for anaphylaxis. 0.3 each 0    ibuprofen 600 mg tablet Take 1 tablet (600 mg) by mouth 3 times a day. PRN      ipratropium (Atrovent) 42 mcg (0.06 %) nasal spray Administer 2 sprays into each nostril 4 times a day.      memantine (Namenda) 10 mg tablet Take 1 tablet (10 mg) by mouth 2 times a day.      metoprolol succinate XL (Toprol-XL) 25 mg 24 hr tablet Take 1 tablet (25 mg) by mouth once daily.      mirtazapine (Remeron) 15 mg tablet Take 1 tablet (15 mg) by mouth once daily at bedtime.      montelukast (Singulair) 10 mg tablet TAKE 1 TABLET BY MOUTH EVERY DAY 90 tablet 3    nitroglycerin (Nitrostat) 0.3 mg SL tablet Place 1 tablet (0.3 mg) under the tongue every 5 minutes if needed.      nystatin (Mycostatin) 100,000 unit/mL suspension Take 5 mL (500,000 Units) by mouth 3 times a day. For oral thrush 300 mL 1    omeprazole (PriLOSEC) 40 mg DR capsule TAKE 1 CAPSULE (40 MG) BY MOUTH TWICE A  capsule 3    oxybutynin XL (Ditropan-XL) 15 mg 24 hr tablet Take 1 tablet (15 mg) by mouth once daily. Do not crush, chew, or split. 90 tablet 3    PARoxetine (Paxil) 30 mg tablet Take 1 tablet (30 mg) by mouth once daily in the morning.      rosuvastatin (Crestor) 20 mg tablet Take 1 tablet (20 mg) by mouth once daily.      spironolactone (Aldactone) 25 mg tablet TAKE 1/2 TABLET BY MOUTH DAILY 45 tablet 3     No current facility-administered medications for this visit.       Allergies   Allergen Reactions    Bee Venom Protein (Honey Bee) Anaphylaxis, Unknown and Other    Sulfa (Sulfonamide Antibiotics) Rash, Unknown, Other and Anaphylaxis     SULFONAMIDE ANTIBIOTICS    angioedema, hives    Bacitracin Swelling    Clindamycin Other    Fish Containing Products Unknown    Nitrofurantoin Macrocrystal Unknown    Oxycodone Other    Shellfish Containing Products Unknown    Tetracycline Swelling    Nitrofurantoin Rash and  "Unknown     burning    Penicillins Itching, Rash, Unknown and Other     When Young; tolerated ceftriaxone Dec 2022    angioedema, hives    Promethazine Other and Rash     NECROTIC TISSUE TO HAND AFTER IV ADMINISTATION, PT. HAD TO HAVE SKIN GRAFTED         Visit Vitals  /73 (BP Location: Right arm, Patient Position: Sitting)   Pulse 94   Ht 1.676 m (5' 6\")   Wt 72.6 kg (160 lb)   SpO2 97%   BMI 25.82 kg/m²   Smoking Status Never   BSA 1.84 m²           Physical Exam:  On exam Ms. Parker appears her stated age, is alert and oriented x3, and in no acute distress. Her sclera are anicteric and her oropharynx has moist mucous membranes. Her neck is supple and without thyromegaly. The JVP is ~8 cm of water above the right atrium. Her cardiac exam has regular rhythm, normal S1, S2. No S3/4. There are no murmurs. Her lungs are clear to auscultation bilaterally and there is no dullness to percussion. Her abdomen is soft, nontender with normoactive bowel sounds. There is no HJR. The extremities are warm and without edema. The skin is dry. There is no rash present. The distal pulses are 2+ in all four extremities. Her mood and affect are appropriate for todays encounter.      Cardiac Labs/Diagnostics:    Lab Results   Component Value Date    CREATININE 0.80 08/09/2024    BUN 18 08/09/2024     08/09/2024    K 4.7 08/09/2024     (H) 08/09/2024    CO2 24 08/09/2024        Recent Labs     08/09/24  1019 12/08/20  0928 12/20/19  1020 05/02/19  1034   CHOL 196 106 110 105   LDLF  --  41 47 46   LDLCALC 137*  --   --   --    HDL 39.6 33.0* 45.0 35.0*   TRIG 98 161* 89 121       Recent Labs     03/16/20  2250   *       ECG (4/15/24):  Sinus tachycardia (), RBBB    ECG (4/18/23):  Sinus rhythm (HR 78), LAD     Echo (12/19/22):  1. Left ventricular systolic function is mildly to moderately decreased with a 35-40% estimated ejection fraction.  2. Entire apex is abnormal.  3. Overall mild to moderate regional " LV systolic dysfunction with an apical wall motion abnormality. Would have benefitted from use of echocontrast to exclude LV apical thrombus given apical akinesis, however unable to obtain iv access.  4. Spectral Doppler shows an impaired relaxation pattern of left ventricular diastolic filling.  5. There is moderate mitral annular calcification.  6. RVSP within normal limits.  7. No prior echocardiogram available for comparison.     Cardiac cath (9/26/22):  1. Severe disease of a ramus branch.  2. Patent prior LAD stent.  3. Small, non-dominant RCA.  4. Normal LVEDP (13mmHg) with no AS.  5. Successful PCI of the ramus with 2.5 x 8 mm Resolute DEBBY.    Impression/Plan:  Ms. Parker is a 78F with a PMHx sig for CAD s/p PCI (LAD; 2014, [place]; 9/2022) and stage C systolic HF/ICM/HFrEF with moderate LV dysfunction who returns to the Advanced Heart Failure clinic for ongoing evaluation and management. At the current time she has functional class III symptoms and appears relatively euvolemic on exam.      1) Stage C acute on chronic systolic HF/ICM/HFrEF with moderate LV dysfunction (LVEF 35-40%; 12/2022)  Continues to have ongoing symptoms that is affecting her QOL. She is currently on maximally tolerated GDMT; she stopped SGLT2. Unfortunately, cannot get CCM covered by her insurance. She will remain on her current medications.    -c/w metoprolol succinate 25 mg daily, entresto 24/26 mg bid, spironolactone 25 mg daily       F/U: as needed at /[facility]       Malachi  Thank you for referring Ms. Parker to the  Advanced Heart Failure Clinic. Please let me know if you have any questions.      ____________________________________________________________  Miguel Leon DO  Section of Advanced Heart Failure and Cardiac Transplantation  Division of Cardiovascular Medicine  [facility] [place]  [facility]

## 2024-09-06 ENCOUNTER — TELEPHONE (OUTPATIENT)
Dept: CARDIOLOGY | Facility: CLINIC | Age: 79
End: 2024-09-06
Payer: MEDICARE

## 2024-09-06 NOTE — TELEPHONE ENCOUNTER
Patient called stating she needs a new echocardiogram done in order to get the CCM implant. Please advise.

## 2024-09-09 DIAGNOSIS — I50.20 HFREF (HEART FAILURE WITH REDUCED EJECTION FRACTION): Primary | ICD-10-CM

## 2024-09-09 DIAGNOSIS — I25.119 CORONARY ARTERY DISEASE INVOLVING NATIVE CORONARY ARTERY OF NATIVE HEART WITH ANGINA PECTORIS: ICD-10-CM

## 2024-09-10 ENCOUNTER — TELEPHONE (OUTPATIENT)
Dept: CARDIOLOGY | Facility: HOSPITAL | Age: 79
End: 2024-09-10
Payer: MEDICARE

## 2024-09-10 NOTE — TELEPHONE ENCOUNTER
LVM instructing patient to contact physician who is requesting echocardiogram to have them order it, per Dr. Walter.

## 2024-09-10 NOTE — TELEPHONE ENCOUNTER
Patient called to office stating she will need to have Echocardiogram completed before her insurance will approve CCM Monitor. Echo was ordered back in May 2024 but was not completed. Scheduled patient for Echo 09/12.

## 2024-09-12 ENCOUNTER — HOSPITAL ENCOUNTER (OUTPATIENT)
Dept: CARDIOLOGY | Facility: CLINIC | Age: 79
Discharge: HOME | End: 2024-09-12
Payer: MEDICARE

## 2024-09-12 VITALS — BODY MASS INDEX: 25.71 KG/M2 | HEIGHT: 66 IN | WEIGHT: 160 LBS

## 2024-09-12 DIAGNOSIS — I25.5 CARDIOMYOPATHY, ISCHEMIC: ICD-10-CM

## 2024-09-12 DIAGNOSIS — I50.22 CHRONIC SYSTOLIC HEART FAILURE (MULTI): ICD-10-CM

## 2024-09-12 DIAGNOSIS — R06.02 SHORTNESS OF BREATH: ICD-10-CM

## 2024-09-12 LAB
AORTIC VALVE MEAN GRADIENT: 8 MMHG
AORTIC VALVE PEAK VELOCITY: 1.87 M/S
AV PEAK GRADIENT: 14 MMHG
AVA (PEAK VEL): 1.11 CM2
AVA (VTI): 1.36 CM2
EJECTION FRACTION APICAL 4 CHAMBER: 40.3
EJECTION FRACTION: 43 %
LEFT VENTRICLE INTERNAL DIMENSION DIASTOLE: 3.8 CM (ref 3.5–6)
LEFT VENTRICULAR OUTFLOW TRACT DIAMETER: 1.7 CM
LV EJECTION FRACTION BIPLANE: 44 %
MITRAL VALVE E/A RATIO: 0.92
MITRAL VALVE E/E' RATIO: 19.2
RIGHT VENTRICLE PEAK SYSTOLIC PRESSURE: 31.3 MMHG

## 2024-09-12 PROCEDURE — 93306 TTE W/DOPPLER COMPLETE: CPT

## 2024-09-12 PROCEDURE — 93306 TTE W/DOPPLER COMPLETE: CPT | Performed by: INTERNAL MEDICINE

## 2024-11-14 ENCOUNTER — APPOINTMENT (OUTPATIENT)
Dept: PRIMARY CARE | Facility: CLINIC | Age: 79
End: 2024-11-14
Payer: MEDICARE

## 2024-11-14 VITALS
OXYGEN SATURATION: 99 % | DIASTOLIC BLOOD PRESSURE: 72 MMHG | SYSTOLIC BLOOD PRESSURE: 126 MMHG | RESPIRATION RATE: 14 BRPM | HEART RATE: 81 BPM

## 2024-11-14 DIAGNOSIS — R39.15 URGENCY OF MICTURITION: ICD-10-CM

## 2024-11-14 DIAGNOSIS — R05.9 COUGH IN ADULT: ICD-10-CM

## 2024-11-14 DIAGNOSIS — K86.1 CHRONIC RECURRENT PANCREATITIS (MULTI): ICD-10-CM

## 2024-11-14 DIAGNOSIS — R39.15 URINARY URGENCY: ICD-10-CM

## 2024-11-14 DIAGNOSIS — K21.9 GASTROESOPHAGEAL REFLUX DISEASE, UNSPECIFIED WHETHER ESOPHAGITIS PRESENT: ICD-10-CM

## 2024-11-14 DIAGNOSIS — Z00.00 MEDICARE ANNUAL WELLNESS VISIT, SUBSEQUENT: Primary | ICD-10-CM

## 2024-11-14 DIAGNOSIS — Z00.00 PREVENTATIVE HEALTH CARE: ICD-10-CM

## 2024-11-14 DIAGNOSIS — D64.9 ANEMIA, UNSPECIFIED TYPE: ICD-10-CM

## 2024-11-14 DIAGNOSIS — M81.0 OSTEOPOROSIS WITHOUT CURRENT PATHOLOGICAL FRACTURE, UNSPECIFIED OSTEOPOROSIS TYPE: ICD-10-CM

## 2024-11-14 DIAGNOSIS — R26.2 DIFFICULTY WALKING: ICD-10-CM

## 2024-11-14 DIAGNOSIS — R73.9 HYPERGLYCEMIA: ICD-10-CM

## 2024-11-14 PROCEDURE — 3078F DIAST BP <80 MM HG: CPT | Performed by: INTERNAL MEDICINE

## 2024-11-14 PROCEDURE — 1124F ACP DISCUSS-NO DSCNMKR DOCD: CPT | Performed by: INTERNAL MEDICINE

## 2024-11-14 PROCEDURE — G0439 PPPS, SUBSEQ VISIT: HCPCS | Performed by: INTERNAL MEDICINE

## 2024-11-14 PROCEDURE — 1036F TOBACCO NON-USER: CPT | Performed by: INTERNAL MEDICINE

## 2024-11-14 PROCEDURE — 1170F FXNL STATUS ASSESSED: CPT | Performed by: INTERNAL MEDICINE

## 2024-11-14 PROCEDURE — 1159F MED LIST DOCD IN RCRD: CPT | Performed by: INTERNAL MEDICINE

## 2024-11-14 PROCEDURE — 99213 OFFICE O/P EST LOW 20 MIN: CPT | Performed by: INTERNAL MEDICINE

## 2024-11-14 PROCEDURE — 1157F ADVNC CARE PLAN IN RCRD: CPT | Performed by: INTERNAL MEDICINE

## 2024-11-14 PROCEDURE — 3074F SYST BP LT 130 MM HG: CPT | Performed by: INTERNAL MEDICINE

## 2024-11-14 RX ORDER — MIRABEGRON 25 MG/1
25 TABLET, FILM COATED, EXTENDED RELEASE ORAL DAILY
Qty: 30 TABLET | Refills: 3 | Status: SHIPPED | OUTPATIENT
Start: 2024-11-14

## 2024-11-14 RX ORDER — BENZONATATE 200 MG/1
200 CAPSULE ORAL 3 TIMES DAILY PRN
Qty: 60 CAPSULE | Refills: 3 | Status: SHIPPED | OUTPATIENT
Start: 2024-11-14

## 2024-11-14 ASSESSMENT — COLUMBIA-SUICIDE SEVERITY RATING SCALE - C-SSRS
6. HAVE YOU EVER DONE ANYTHING, STARTED TO DO ANYTHING, OR PREPARED TO DO ANYTHING TO END YOUR LIFE?: NO
2. HAVE YOU ACTUALLY HAD ANY THOUGHTS OF KILLING YOURSELF?: NO
1. IN THE PAST MONTH, HAVE YOU WISHED YOU WERE DEAD OR WISHED YOU COULD GO TO SLEEP AND NOT WAKE UP?: NO

## 2024-11-14 ASSESSMENT — ACTIVITIES OF DAILY LIVING (ADL)
DRESSING: INDEPENDENT
MANAGING_FINANCES: INDEPENDENT
BATHING: INDEPENDENT
DOING_HOUSEWORK: INDEPENDENT
GROCERY_SHOPPING: INDEPENDENT
TAKING_MEDICATION: INDEPENDENT

## 2024-11-14 ASSESSMENT — ENCOUNTER SYMPTOMS
JOINT SWELLING: 0
VOMITING: 0
NAUSEA: 0
FEVER: 0
SHORTNESS OF BREATH: 0
MYALGIAS: 0
COUGH: 0
CONSTIPATION: 0
DIAPHORESIS: 0
DIARRHEA: 0
CHILLS: 0
FATIGUE: 1

## 2024-11-14 ASSESSMENT — PATIENT HEALTH QUESTIONNAIRE - PHQ9
2. FEELING DOWN, DEPRESSED OR HOPELESS: NOT AT ALL
SUM OF ALL RESPONSES TO PHQ9 QUESTIONS 1 AND 2: 0
1. LITTLE INTEREST OR PLEASURE IN DOING THINGS: NOT AT ALL

## 2024-11-14 NOTE — PROGRESS NOTES
Subjective   Reason for Visit: Gianna Parker is an 79 y.o. female here for a Medicare Wellness visit.   HAD FLU SHOT          Reviewed all medications by prescribing practitioner or clinical pharmacist (such as prescriptions, OTCs, herbal therapies and supplements) and documented in the medical record.    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    SEE DR. JUDGE FOR PSYCHIATRY    TAKING MEDS FOR BLADDER NOT HELPING, STILL GO MULTIPLE MULTIPLE TIMES A DAY.    HAS ESOPHAGEAL ISSUES AS WELL AS PANCREAS ISSUES - DR. STEWART    W[facility] IN [facility] [place] [facility], HAD EXPOSURES TO BURN PITS    TAKING CARE OF SPOUSE AT HOME FOLLOWING HIS FALL AND FRACTURE    GETS EYES CHECKED REGULARLY    LOST 20-30 POUNDS IN FEW YEARS, NOT INTENTIONALLY  DIFFICULTY WALKING FROM BACK.ARTHRITIS/CHF, ASK FOR DISABILITY PLACARD      Patient Care Team:  Nando Lombardi MD as PCP - General (Internal Medicine)  Nando Lombardi MD as PCP - [employer]vantage PCP  Margaret García MD as Cardiologist (Cardiology)     Review of Systems   Constitutional:  Positive for fatigue. Negative for chills, diaphoresis and fever.   Respiratory:  Negative for cough and shortness of breath.    Cardiovascular:  Negative for chest pain and leg swelling.   Gastrointestinal:  Negative for constipation, diarrhea, nausea and vomiting.   Musculoskeletal:  Negative for joint swelling and myalgias.       Objective   Vitals:  /72   Pulse 81   Resp 14   SpO2 99%       Physical Exam  Vitals reviewed.   Constitutional:       General: She is not in acute distress.     Appearance: She is not ill-appearing.   Cardiovascular:      Rate and Rhythm: Normal rate and regular rhythm.      Pulses: Normal pulses.      Heart sounds:      No gallop.   Pulmonary:      Breath sounds: Normal breath sounds. No wheezing, rhonchi or rales.   Abdominal:      General: Abdomen is flat. Bowel sounds are normal.      Palpations:  Abdomen is soft.      Tenderness: There is no guarding or rebound.   Musculoskeletal:      Right lower leg: No edema.      Left lower leg: No edema.         Assessment & Plan  Preventative health care    Orders:    zoster vaccine-recombinant adjuvanted (Shingrix) 50 mcg/0.5 mL vaccine; Inject 0.5 mL into the muscle 1 time for 1 dose.    Urinary urgency    Orders:    mirabegron (Mybetriq) 25 mg tablet extended release 24 hr 24 hr tablet; Take 1 tablet (25 mg) by mouth once daily.    Urgency of micturition    Orders:    mirabegron (Mybetriq) 25 mg tablet extended release 24 hr 24 hr tablet; Take 1 tablet (25 mg) by mouth once daily.    Anemia, unspecified type    Orders:    Iron and TIBC; Future    Ferritin; Future    CBC; Future    Folate; Future    Serum Protein Electrophoresis; Future    Cough in adult    Orders:    benzonatate (Tessalon) 200 mg capsule; Take 1 capsule (200 mg) by mouth 3 times a day as needed for cough. Do not crush or chew.    Osteoporosis without current pathological fracture, unspecified osteoporosis type    Orders:    Referral to Endocrinology; Future    Hyperglycemia    Orders:    Hemoglobin A1C; Future    Difficulty walking    Orders:    Disability Placard Medicare annual wellness visit, subsequent         Gastroesophageal reflux disease, unspecified whether esophagitis present         Chronic recurrent pancreatitis (Multi)            Patient Instructions    NON-FASTING LABS ARE ORDERED FOR YOU     2.  SINCE YOU HAVE ESOPHAGUS [PROBLEMS CHRONICALLY, THE TYPICAL OSTEOPOROSIS TREATMENT IS NOT REALLY GOOD FOR YOU, SO I PUT REFERRRAL IN TO AN ENDOCRINOLOGIST TO INVESTIGATE OTHER WAYS OF TREATING YOUR OSTEOPOROSIS    3.  STOP OXYBUTYNIN AND TRIAL MIRAGEBRON A NEWER MEDICATION TO HELP WITH URINARY URGENCY    4.  YOU SHOULD LET DR. STEWART KNOW YOU HAVE LOST WEIGHT UNINTENTIONALLY SINCE HE HAS BEEN MONITORING YOUR PANCREATIC STATUS FOR YEARS    5.  TESSALON PEARLS SENT IN FOR YOU FOR COUGHING    6.   YOU OTHERWISE APPEAR TO BE CAUGHT UP FROM A MEDICARE STANDPOINT FOR HEALTH SCREENING    7.  ALSO YOU SHOULD BE TAKING OVER THE COUNTER VITAMIN D3 2000 IU DAILY, AND VITAMIN B12 1000 MCG DAILY SUPPLEMENTS TO OPTIMIZE BONE HEALTH AND BLOOD COUNT    8.  FOLLOW UP 4 MONTHS OR AS NEEDED.      9.  IN YOUR PARTICULAR CASE, I WOULD CONTINUE WITH COVID AND RSV IMMUNIZATION PROTOCOLS FOR THIS YEAR    10.  I PRINTED OUT SHINGRIX IMMUNIZATION SCRIPT FOR YOU TO GET AT LOCAL PHARMACY

## 2024-11-14 NOTE — ASSESSMENT & PLAN NOTE
Orders:    mirabegron (Mybetriq) 25 mg tablet extended release 24 hr 24 hr tablet; Take 1 tablet (25 mg) by mouth once daily.

## 2024-11-14 NOTE — PATIENT INSTRUCTIONS
NON-FASTING LABS ARE ORDERED FOR YOU     2.  SINCE YOU HAVE ESOPHAGUS [PROBLEMS CHRONICALLY, THE TYPICAL OSTEOPOROSIS TREATMENT IS NOT REALLY GOOD FOR YOU, SO I PUT REFERRRAL IN TO AN ENDOCRINOLOGIST TO INVESTIGATE OTHER WAYS OF TREATING YOUR OSTEOPOROSIS    3.  STOP OXYBUTYNIN AND TRIAL MIRAGEBRON A NEWER MEDICATION TO HELP WITH URINARY URGENCY    4.  YOU SHOULD LET DR. STEWART KNOW YOU HAVE LOST WEIGHT UNINTENTIONALLY SINCE HE HAS BEEN MONITORING YOUR PANCREATIC STATUS FOR YEARS    5.  TESSALON PEARLS SENT IN FOR YOU FOR COUGHING    6.  YOU OTHERWISE APPEAR TO BE CAUGHT UP FROM A MEDICARE STANDPOINT FOR HEALTH SCREENING    7.  ALSO YOU SHOULD BE TAKING OVER THE COUNTER VITAMIN D3 2000 IU DAILY, AND VITAMIN B12 1000 MCG DAILY SUPPLEMENTS TO OPTIMIZE BONE HEALTH AND BLOOD COUNT    8.  FOLLOW UP 4 MONTHS OR AS NEEDED.      9.  IN YOUR PARTICULAR CASE, I WOULD CONTINUE WITH COVID AND RSV IMMUNIZATION PROTOCOLS FOR THIS YEAR    10.  I PRINTED OUT SHINGRIX IMMUNIZATION SCRIPT FOR YOU TO GET AT LOCAL PHARMACY

## 2024-11-14 NOTE — ASSESSMENT & PLAN NOTE
Orders:    Iron and TIBC; Future    Ferritin; Future    CBC; Future    Folate; Future    Serum Protein Electrophoresis; Future

## 2024-11-21 ENCOUNTER — LAB (OUTPATIENT)
Dept: LAB | Facility: LAB | Age: 79
End: 2024-11-21
Payer: MEDICARE

## 2024-11-21 DIAGNOSIS — D64.9 ANEMIA, UNSPECIFIED TYPE: ICD-10-CM

## 2024-11-21 DIAGNOSIS — R73.9 HYPERGLYCEMIA: ICD-10-CM

## 2024-11-21 DIAGNOSIS — I10 PRIMARY HYPERTENSION: ICD-10-CM

## 2024-11-21 LAB
CREAT UR-MCNC: 99.3 MG/DL (ref 20–320)
ERYTHROCYTE [DISTWIDTH] IN BLOOD BY AUTOMATED COUNT: 18.4 % (ref 11.5–14.5)
EST. AVERAGE GLUCOSE BLD GHB EST-MCNC: 123 MG/DL
FERRITIN SERPL-MCNC: 24 NG/ML (ref 8–150)
HBA1C MFR BLD: 5.9 %
HCT VFR BLD AUTO: 30.5 % (ref 36–46)
HGB BLD-MCNC: 9 G/DL (ref 12–16)
IRON SATN MFR SERPL: 6 % (ref 25–45)
IRON SERPL-MCNC: 21 UG/DL (ref 35–150)
MCH RBC QN AUTO: 23.4 PG (ref 26–34)
MCHC RBC AUTO-ENTMCNC: 29.5 G/DL (ref 32–36)
MCV RBC AUTO: 79 FL (ref 80–100)
NRBC BLD-RTO: 0 /100 WBCS (ref 0–0)
PLATELET # BLD AUTO: 379 X10*3/UL (ref 150–450)
PROT SERPL-MCNC: 6.8 G/DL (ref 6.4–8.2)
PROT UR-ACNC: 17 MG/DL (ref 5–24)
PROT/CREAT UR: 0.17 MG/MG CREAT (ref 0–0.17)
RBC # BLD AUTO: 3.84 X10*6/UL (ref 4–5.2)
TIBC SERPL-MCNC: 340 UG/DL (ref 240–445)
UIBC SERPL-MCNC: 319 UG/DL (ref 110–370)
WBC # BLD AUTO: 7.6 X10*3/UL (ref 4.4–11.3)

## 2024-11-21 PROCEDURE — 82570 ASSAY OF URINE CREATININE: CPT

## 2024-11-21 PROCEDURE — 36415 COLL VENOUS BLD VENIPUNCTURE: CPT

## 2024-11-21 PROCEDURE — 84165 PROTEIN E-PHORESIS SERUM: CPT | Performed by: INTERNAL MEDICINE

## 2024-11-21 PROCEDURE — 85027 COMPLETE CBC AUTOMATED: CPT

## 2024-11-21 PROCEDURE — 84156 ASSAY OF PROTEIN URINE: CPT

## 2024-11-21 PROCEDURE — 83036 HEMOGLOBIN GLYCOSYLATED A1C: CPT

## 2024-11-21 PROCEDURE — 82746 ASSAY OF FOLIC ACID SERUM: CPT

## 2024-11-21 PROCEDURE — 84155 ASSAY OF PROTEIN SERUM: CPT

## 2024-11-21 PROCEDURE — 82728 ASSAY OF FERRITIN: CPT

## 2024-11-21 PROCEDURE — 83540 ASSAY OF IRON: CPT

## 2024-11-21 PROCEDURE — 83550 IRON BINDING TEST: CPT

## 2024-11-21 PROCEDURE — 84165 PROTEIN E-PHORESIS SERUM: CPT

## 2024-11-22 LAB
ALBUMIN: 3.7 G/DL (ref 3.4–5)
ALPHA 1 GLOBULIN: 0.3 G/DL (ref 0.2–0.6)
ALPHA 2 GLOBULIN: 0.7 G/DL (ref 0.4–1.1)
BETA GLOBULIN: 0.9 G/DL (ref 0.5–1.2)
FOLATE SERPL-MCNC: 8.3 NG/ML
GAMMA GLOBULIN: 1.1 G/DL (ref 0.5–1.4)
PATH REVIEW-SERUM PROTEIN ELECTROPHORESIS: NORMAL
PROTEIN ELECTROPHORESIS COMMENT: NORMAL

## 2024-11-25 ENCOUNTER — TELEPHONE (OUTPATIENT)
Dept: PRIMARY CARE | Facility: CLINIC | Age: 79
End: 2024-11-25
Payer: MEDICARE

## 2024-11-25 DIAGNOSIS — D50.9 IRON DEFICIENCY ANEMIA, UNSPECIFIED IRON DEFICIENCY ANEMIA TYPE: Primary | ICD-10-CM

## 2024-11-25 NOTE — TELEPHONE ENCOUNTER
ANA MONIQUE, I SENT OUY A RESULTS NOTE, BUT ALSO PLEASE ASK HER TO AVOID TAKING NSAIDS LIKE IBUPROFEN/ALEVE/ADVIL/MOTRIN/NAPROXEN FOR NOW.  TYLENOL IS OK, THX

## 2024-11-26 DIAGNOSIS — B37.0 THRUSH: ICD-10-CM

## 2024-11-27 RX ORDER — EPINEPHRINE 0.3 MG/.3ML
1 INJECTION SUBCUTANEOUS AS NEEDED
Qty: 1 EACH | Refills: 3 | Status: SHIPPED | OUTPATIENT
Start: 2024-11-27

## 2025-01-22 ENCOUNTER — TELEPHONE (OUTPATIENT)
Dept: PRIMARY CARE | Facility: CLINIC | Age: 80
End: 2025-01-22
Payer: MEDICARE

## 2025-01-22 DIAGNOSIS — R39.15 URINARY URGENCY: Primary | ICD-10-CM

## 2025-01-22 RX ORDER — OXYBUTYNIN CHLORIDE 15 MG/1
15 TABLET, EXTENDED RELEASE ORAL DAILY
Qty: 90 TABLET | Refills: 3 | Status: SHIPPED | OUTPATIENT
Start: 2025-01-22 | End: 2026-01-22

## 2025-01-22 NOTE — TELEPHONE ENCOUNTER
Pt advising that the mirabegron (Mybetriq) 25 mg tablet extended release 24 hr 24 hr tablet does not work.    Asking to go back on the medication she was on (she couldn't remember the name of it, but said you would know).      Asking if you could send in order to [facility] [address]?

## 2025-02-06 DIAGNOSIS — J45.909 ASTHMA, UNSPECIFIED ASTHMA SEVERITY, UNSPECIFIED WHETHER COMPLICATED, UNSPECIFIED WHETHER PERSISTENT (HHS-HCC): ICD-10-CM

## 2025-02-07 RX ORDER — MONTELUKAST SODIUM 10 MG/1
10 TABLET ORAL DAILY
Qty: 90 TABLET | Refills: 3 | Status: SHIPPED | OUTPATIENT
Start: 2025-02-07

## 2025-03-04 ENCOUNTER — APPOINTMENT (OUTPATIENT)
Dept: PRIMARY CARE | Facility: CLINIC | Age: 80
End: 2025-03-04
Payer: MEDICARE

## 2025-03-09 DIAGNOSIS — R05.9 COUGH IN ADULT: ICD-10-CM

## 2025-03-10 RX ORDER — BENZONATATE 200 MG/1
200 CAPSULE ORAL 3 TIMES DAILY PRN
Qty: 60 CAPSULE | Refills: 3 | Status: SHIPPED | OUTPATIENT
Start: 2025-03-10

## 2025-03-19 ENCOUNTER — APPOINTMENT (OUTPATIENT)
Dept: PRIMARY CARE | Facility: CLINIC | Age: 80
End: 2025-03-19
Payer: MEDICARE

## 2025-03-21 ENCOUNTER — OFFICE VISIT (OUTPATIENT)
Dept: CARDIOLOGY | Facility: CLINIC | Age: 80
End: 2025-03-21
Payer: MEDICARE

## 2025-03-21 VITALS
HEIGHT: 66 IN | HEART RATE: 78 BPM | BODY MASS INDEX: 23.14 KG/M2 | WEIGHT: 144 LBS | DIASTOLIC BLOOD PRESSURE: 60 MMHG | SYSTOLIC BLOOD PRESSURE: 130 MMHG

## 2025-03-21 DIAGNOSIS — Z78.9 NEVER SMOKED CIGARETTES: ICD-10-CM

## 2025-03-21 DIAGNOSIS — I25.119 CORONARY ARTERY DISEASE INVOLVING NATIVE CORONARY ARTERY OF NATIVE HEART WITH ANGINA PECTORIS: ICD-10-CM

## 2025-03-21 DIAGNOSIS — Z01.810 PREOP CARDIOVASCULAR EXAM: ICD-10-CM

## 2025-03-21 DIAGNOSIS — Z71.89 ENCOUNTER TO DISCUSS TREATMENT OPTIONS: ICD-10-CM

## 2025-03-21 DIAGNOSIS — I25.5 CARDIOMYOPATHY, ISCHEMIC: ICD-10-CM

## 2025-03-21 DIAGNOSIS — I10 BENIGN ESSENTIAL HYPERTENSION: ICD-10-CM

## 2025-03-21 DIAGNOSIS — I50.20 HFREF (HEART FAILURE WITH REDUCED EJECTION FRACTION): Primary | ICD-10-CM

## 2025-03-21 DIAGNOSIS — Z71.89 ENCOUNTER FOR MEDICATION REVIEW AND COUNSELING: ICD-10-CM

## 2025-03-21 PROCEDURE — 99215 OFFICE O/P EST HI 40 MIN: CPT | Performed by: INTERNAL MEDICINE

## 2025-03-21 PROCEDURE — 3075F SYST BP GE 130 - 139MM HG: CPT | Performed by: INTERNAL MEDICINE

## 2025-03-21 PROCEDURE — 1036F TOBACCO NON-USER: CPT | Performed by: INTERNAL MEDICINE

## 2025-03-21 PROCEDURE — 93000 ELECTROCARDIOGRAM COMPLETE: CPT | Performed by: INTERNAL MEDICINE

## 2025-03-21 PROCEDURE — 1159F MED LIST DOCD IN RCRD: CPT | Performed by: INTERNAL MEDICINE

## 2025-03-21 PROCEDURE — 1157F ADVNC CARE PLAN IN RCRD: CPT | Performed by: INTERNAL MEDICINE

## 2025-03-21 PROCEDURE — 3078F DIAST BP <80 MM HG: CPT | Performed by: INTERNAL MEDICINE

## 2025-03-21 PROCEDURE — 1123F ACP DISCUSS/DSCN MKR DOCD: CPT | Performed by: INTERNAL MEDICINE

## 2025-03-21 RX ORDER — CHLORHEXIDINE GLUCONATE 40 MG/ML
SOLUTION TOPICAL ONCE
OUTPATIENT
Start: 2025-03-21 | End: 2025-03-21

## 2025-03-21 RX ORDER — SODIUM CHLORIDE 9 MG/ML
100 INJECTION, SOLUTION INTRAVENOUS CONTINUOUS
OUTPATIENT
Start: 2025-03-21 | End: 2025-03-22

## 2025-03-21 RX ORDER — MUPIROCIN 20 MG/G
1 OINTMENT TOPICAL ONCE
OUTPATIENT
Start: 2025-03-21 | End: 2025-03-21

## 2025-03-21 RX ORDER — VANCOMYCIN HYDROCHLORIDE 1 G/200ML
1000 INJECTION, SOLUTION INTRAVENOUS ONCE
OUTPATIENT
Start: 2025-03-21 | End: 2025-03-21

## 2025-03-21 ASSESSMENT — ENCOUNTER SYMPTOMS
DYSPNEA ON EXERTION: 0
PALPITATIONS: 0

## 2025-03-21 NOTE — PATIENT INSTRUCTIONS
Pre-Procedure Patient Information    You have been scheduled for: Implant [employer] Cardiac Contractility Device, with [employer] leads.  Venogram to be done first, under monitored care anesthesia  At: [facility]  With: Dr. García   Date of procedure: Wednesday, April 9, 2025    1. Please have transportation to and from the hospital. While you should plan for same-day discharge there is a possibility you will need to stay overnight.    2. You will receive a call from the hospital 24 hours before your procedure providing you with fasting instructions, procedure location detail, and time of arrival.  If you have not received a call from the hospital by 6 pm the day before your scheduled procedure, please call [phone].    3. Please bring a current list of medications with you to the hospital.      4. Medications to hold:   oral diabetes medications on the morning of.  None per Dr. García         - Otherwise, you may continue your medications in the morning with sips of water.     5. Nothing to eat after midnight before the procedure. OK to take morning medications, with above exceptions, the day of the procedure with a small sip of water.     6. Please bring to our attention if you have any contrast, latex or metal allergies.    7. Please have your blood work as instructed completed at least a day before your procedure.    8. If you have any questions, please contact the office at [phone].    Follow up 7 days after for an incision check

## 2025-03-21 NOTE — PROGRESS NOTES
"Chief Complaint:   Follow-up (Pt is here today following up after 6 months, discuss device )     History Of Present Illness:    Gianna Parker is a 79 y.o. female presenting with evaluation for impulse dynamics contractility device placement.  She is accompanied by her friend.    Is almost 2 years since we started the process for contractility device placement.  After legal review and discussion with charge, patient brings the information that her cardiac contractility device implant was approved.  The papers were reviewed in detail with her  She requested that I perform her procedure. She inquires who is Dr. Watkins as his name is CCed on the document, and she has not met him.  I reviewed with her that the device implant is with me and that is likely a clerical error on the letter    Last Recorded Vitals:  Vitals:    25 1207   BP: 130/60   BP Location: Left arm   Patient Position: Sitting   Pulse: 78   Weight: 65.3 kg (144 lb)   Height: 1.676 m (5' 6\")       Past Medical History:  She has no past medical history on file.    Past Surgical History:  She has a past surgical history that includes Tonsilectomy, adenoidectomy, bilateral myringotomy and tubes;  section, classic; Hysterectomy; Pancreas surgery; Shoulder surgery (Bilateral); Knee surgery (Bilateral); Carotid stent; and Pancreas surgery.  Has right sided Mediport.  Social History:  She reports that she has never smoked. She has never used smokeless tobacco. She reports that she does not currently use alcohol. She reports that she does not use drugs.    Family History:  Family History   Problem Relation Name Age of Onset    Coronary artery disease Mother      Coronary artery disease Father      Coronary artery disease Other sibling         Allergies:  Bee venom protein (honey bee), Sulfa (sulfonamide antibiotics), Bacitracin, Clindamycin, Fish containing products, Nitrofurantoin macrocrystal, Oxycodone, Shellfish containing products, Tetracycline, " Nitrofurantoin, Penicillins, and Promethazine    Outpatient Medications:  Current Outpatient Medications   Medication Instructions    albuterol (Ventolin HFA) 90 mcg/actuation inhaler 2 puffs, inhalation, Every 6 hours PRN    benzonatate (TESSALON) 200 mg, oral, 3 times daily PRN, Do not crush or chew.    ciprofloxacin (CIPRO) 500 mg, oral, Daily PRN    Entresto 24-26 mg tablet 1 tablet, oral, 2 times daily    EPINEPHrine (EPIPEN) 0.3 mg, intramuscular, As needed    ibuprofen 600 mg tablet 1 tablet, 3 times daily    ipratropium (Atrovent) 42 mcg (0.06 %) nasal spray 2 sprays, 4 times daily    memantine (NAMENDA) 10 mg, 2 times daily    metoprolol succinate XL (TOPROL-XL) 25 mg, Daily    mirtazapine (Remeron) 15 mg tablet 1 tablet, Nightly    montelukast (SINGULAIR) 10 mg, oral, Daily    nitroglycerin (Nitrostat) 0.3 mg SL tablet 1 tablet, Every 5 min PRN    nystatin (MYCOSTATIN) 500,000 Units, oral, 3 times daily, For oral thrush    omeprazole (PRILOSEC) 40 mg, oral, 2 times daily    oxybutynin XL (DITROPAN-XL) 15 mg, oral, Daily, Do not crush, chew, or split.    PARoxetine (PAXIL) 30 mg, Every morning    rosuvastatin (CRESTOR) 20 mg, Daily    spironolactone (ALDACTONE) 12.5 mg, oral, Daily   Review of Systems   Cardiovascular:  Negative for chest pain, dyspnea on exertion and palpitations.   All other systems reviewed and are negative.        Physical Exam:  Constitutional:       General: Awake.      Appearance: Normal and healthy appearance. Well-developed and not in distress.   Neck:      Vascular: No JVR. JVD normal.   Pulmonary:      Effort: Pulmonary effort is normal.      Breath sounds: Normal breath sounds. No wheezing. No rhonchi. No rales.   Chest:      Chest wall: Not tender to palpatation.      Comments: R sided mediport  Cardiovascular:      Abnormal PMI at L AAL. Normal rate. Regular rhythm. Normal S1. Normal S2.       Murmurs: There is no murmur.      No gallop.  No click. No rub.   Pulses:     Intact  distal pulses.   Edema:     Peripheral edema absent.   Abdominal:      Tenderness: There is no abdominal tenderness.   Musculoskeletal: Normal range of motion.         General: No tenderness. Skin:     General: Skin is warm and dry.   Neurological:      General: No focal deficit present.      Mental Status: Alert and oriented to person, place and time.            Last Labs:  CBC -  Lab Results   Component Value Date    WBC 7.6 11/21/2024    HGB 9.0 (L) 11/21/2024    HCT 30.5 (L) 11/21/2024    MCV 79 (L) 11/21/2024     11/21/2024       CMP -  Lab Results   Component Value Date    CALCIUM 10.2 08/09/2024    PHOS 1.8 (L) 12/29/2022    PROT 6.8 11/21/2024    PROT 6.7 08/09/2024    ALBUMIN 3.7 08/09/2024    AST 12 08/09/2024    ALT 10 08/09/2024    ALKPHOS 100 08/09/2024    BILITOT 0.6 08/09/2024       LIPID PANEL -   Lab Results   Component Value Date    CHOL 196 08/09/2024    TRIG 98 08/09/2024    HDL 39.6 08/09/2024    CHHDL 4.9 08/09/2024    LDLF 41 12/08/2020    VLDL 20 08/09/2024    NHDL 156 (H) 08/09/2024       RENAL FUNCTION PANEL -   Lab Results   Component Value Date    GLUCOSE 99 08/09/2024     08/09/2024    K 4.7 08/09/2024     (H) 08/09/2024    CO2 24 08/09/2024    ANIONGAP 11 08/09/2024    BUN 18 08/09/2024    CREATININE 0.80 08/09/2024    CALCIUM 10.2 08/09/2024    PHOS 1.8 (L) 12/29/2022    ALBUMIN 3.7 08/09/2024        Lab Results   Component Value Date     (H) 03/16/2020    HGBA1C 5.9 (H) 11/21/2024       Last Cardiology Tests:  ECG:  ECG 12 Lead 05/31/2024    Today.  Normal sinus rhythm.  Right axis deviation.  Corrected QT interval 440 ms.  Right bundle branch block.  LVH  Echo:  Transthoracic Echo Complete 09/12/2024      Ejection Fractions:  EF   Date/Time Value Ref Range Status   09/12/2024 01:45 PM 43 %          Lab review: I have personally reviewed the laboratory result(s) see above    Assessment/Plan   Problem List Items Addressed This Visit             ICD-10-CM     HFrEF (heart failure with reduced ejection fraction) - Primary I50.20    Coronary artery disease involving native coronary artery of native heart with angina pectoris I25.119    Benign essential hypertension I10    Cardiomyopathy, ischemic I25.5    Never smoked cigarettes Z78.9    Encounter for medication review and counseling Z71.89    Encounter to discuss treatment options Z71.89     Other Visit Diagnoses         Codes    Body mass index (BMI) of 23.0 to 23.9 in adult     Z68.23              Yasemin Silverio LPN    Ischemic cardiomyopathy ejection fraction 35 to 40% in 2022 and 43% 2020 for. Chronic systolic heart failure. NYHA IIIc heart failure.  Now with right bundle branch/IVCD with  ms.  On optimal heart failure medications as maximally tolerated.  Shared decision making with patient 2020 3, 2024, and at today's visit for CCM Ventricular device. Also  reviewed data with Dr. Leon over the past 2 years through each stage of review for CMM device. The device has not been approved over the past year despite multiple measures and including appeal and discussion have been made on behalf of patient.  Most recent communication from [employer] reports appeal is in review for case #[id number].  Reviewed Dr. Walter's previous office notes as well.  Reviewed copy of letter from  regarding contractility device has been approved.  Preoperative cardiac evaluation performed.  Consent obtained.  All questions answered.  Coronary artery disease. Status post PCI stents. Most recent PCI stent rhythm 2022     AHA recommendations for exercise, diet, and behavioral modification reviewed with pt.     Counseling over 50% visit performed.  The patient and I discussed the mechanism of arrhythmia, CHF, ejection fraction, last echocardiogram, CCM device, [place] decision tool, preoperative cardiac evaluation, plantar indications for and if refills of medications needed, treatment options, risks, benefits, and  imponderables. American Heart Association lifestyle changes and behavioral modification discussed. All questions answered in detail. Patient appreciative of care.  Also left message for  Dr. Leon after today's office visit.

## 2025-03-25 ENCOUNTER — APPOINTMENT (OUTPATIENT)
Dept: CARDIOLOGY | Facility: CLINIC | Age: 80
End: 2025-03-25
Payer: MEDICARE

## 2025-04-03 ENCOUNTER — APPOINTMENT (OUTPATIENT)
Dept: CARDIOLOGY | Facility: CLINIC | Age: 80
End: 2025-04-03
Payer: MEDICARE

## 2025-04-03 ENCOUNTER — LAB (OUTPATIENT)
Dept: LAB | Facility: HOSPITAL | Age: 80
End: 2025-04-03
Payer: MEDICARE

## 2025-04-03 DIAGNOSIS — I50.20 UNSPECIFIED SYSTOLIC (CONGESTIVE) HEART FAILURE: Primary | ICD-10-CM

## 2025-04-03 LAB
ANION GAP SERPL CALC-SCNC: 13 MMOL/L (ref 10–20)
APTT PPP: 29 SECONDS (ref 26–36)
BUN SERPL-MCNC: 14 MG/DL (ref 6–23)
CALCIUM SERPL-MCNC: 10 MG/DL (ref 8.6–10.3)
CHLORIDE SERPL-SCNC: 105 MMOL/L (ref 98–107)
CO2 SERPL-SCNC: 25 MMOL/L (ref 21–32)
CREAT SERPL-MCNC: 0.75 MG/DL (ref 0.5–1.05)
EGFRCR SERPLBLD CKD-EPI 2021: 81 ML/MIN/1.73M*2
ERYTHROCYTE [DISTWIDTH] IN BLOOD BY AUTOMATED COUNT: 18.8 % (ref 11.5–14.5)
GLUCOSE SERPL-MCNC: 124 MG/DL (ref 74–99)
HCT VFR BLD AUTO: 30.6 % (ref 36–46)
HGB BLD-MCNC: 8.9 G/DL (ref 12–16)
INR PPP: 1.1 (ref 0.9–1.1)
MCH RBC QN AUTO: 22.7 PG (ref 26–34)
MCHC RBC AUTO-ENTMCNC: 29.1 G/DL (ref 32–36)
MCV RBC AUTO: 78 FL (ref 80–100)
NRBC BLD-RTO: 0 /100 WBCS (ref 0–0)
PLATELET # BLD AUTO: 381 X10*3/UL (ref 150–450)
POTASSIUM SERPL-SCNC: 3.8 MMOL/L (ref 3.5–5.3)
PROTHROMBIN TIME: 11.9 SECONDS (ref 9.8–12.4)
RBC # BLD AUTO: 3.92 X10*6/UL (ref 4–5.2)
SODIUM SERPL-SCNC: 139 MMOL/L (ref 136–145)
WBC # BLD AUTO: 7.1 X10*3/UL (ref 4.4–11.3)

## 2025-04-03 PROCEDURE — 85610 PROTHROMBIN TIME: CPT

## 2025-04-03 PROCEDURE — 80048 BASIC METABOLIC PNL TOTAL CA: CPT

## 2025-04-03 PROCEDURE — 85027 COMPLETE CBC AUTOMATED: CPT

## 2025-04-03 PROCEDURE — 85730 THROMBOPLASTIN TIME PARTIAL: CPT

## 2025-04-03 NOTE — H&P (VIEW-ONLY)
Chief Complaint:   No chief complaint on file.     History Of Present Illness:    Gianna Parker is a 79 y.o. female with a history of CAD s/p MI (PCI to the LAD  and PCI to ramus 2022), chronic systolic heart failure, dyslipidemia, obesity, and hypertension here for follow-up.     Couple episodes of chest pain when dealing with insurance regarding her CCM.  It is still been denied by insurance.  She has been contacted by representatives who have informed her that there may be a study that she could enroll in through the [facility].     Otherwise no exertional chest pain.  Shortness of breath is chronic and unchanged.    Reviewed Dr. García's note from 3/21/2025. Reviewed Dr. Leon's note from 2024.     Echocardiogram 2022: EF 35 to 40%. Hypokinesis of the apex. Grade 1 diastolic dysfunction. RVSP normal at 25 mmHg.     PCI to the ramus 2022: Resolute [place]illa 2.5 x 8 mm DEBBY     Catheterization 2015: Left main with no significant disease. LAD with widely patent previously placed stents. LCx is dominant with no significant stenosis. Ramus intermedius has an ostial 50% stenosis. RCA is small and nondominant.     PCI of LAD 3/16/2014: LAD stented with Promus 2.5 x 24 mm DEBBY      Past Medical History:  She has no past medical history on file.    Past Surgical History:  She has a past surgical history that includes Tonsilectomy, adenoidectomy, bilateral myringotomy and tubes;  section, classic; Hysterectomy; Pancreas surgery; Shoulder surgery (Bilateral); Knee surgery (Bilateral); Carotid stent; and Pancreas surgery.      Social History:  She reports that she has never smoked. She has never used smokeless tobacco. She reports that she does not currently use alcohol. She reports that she does not use drugs.    Family History:  Family History   Problem Relation Name Age of Onset    Coronary artery disease Mother      Coronary artery disease Father      Coronary artery disease Other sibling          Allergies:  Bee venom protein (honey bee), Sulfa (sulfonamide antibiotics), Bacitracin, Clindamycin, Fish containing products, Nitrofurantoin macrocrystal, Oxycodone, Shellfish containing products, Tetracycline, Nitrofurantoin, Penicillins, and Promethazine    Outpatient Medications:  Current Outpatient Medications   Medication Instructions    albuterol (Ventolin HFA) 90 mcg/actuation inhaler 2 puffs, inhalation, Every 6 hours PRN    benzonatate (TESSALON) 200 mg, oral, 3 times daily PRN, Do not crush or chew.    ciprofloxacin (CIPRO) 500 mg, oral, Daily PRN    Entresto 24-26 mg tablet 1 tablet, oral, 2 times daily    EPINEPHrine (EPIPEN) 0.3 mg, intramuscular, As needed    ibuprofen 600 mg tablet 1 tablet, 3 times daily    ipratropium (Atrovent) 42 mcg (0.06 %) nasal spray 2 sprays, 4 times daily    memantine (NAMENDA) 10 mg, 2 times daily    metoprolol succinate XL (TOPROL-XL) 25 mg, Daily    mirtazapine (Remeron) 15 mg tablet 1 tablet, Nightly    montelukast (SINGULAIR) 10 mg, oral, Daily    nitroglycerin (Nitrostat) 0.3 mg SL tablet 1 tablet, Every 5 min PRN    nystatin (MYCOSTATIN) 500,000 Units, oral, 3 times daily, For oral thrush    omeprazole (PRILOSEC) 40 mg, oral, 2 times daily    oxybutynin XL (DITROPAN-XL) 15 mg, oral, Daily, Do not crush, chew, or split.    PARoxetine (PAXIL) 30 mg, Every morning    rosuvastatin (CRESTOR) 20 mg, Daily    spironolactone (ALDACTONE) 12.5 mg, oral, Daily       Last Recorded Vitals:  Visit Vitals  Smoking Status Never      LASTWT(3):   Wt Readings from Last 3 Encounters:   03/21/25 65.3 kg (144 lb)   09/12/24 72.6 kg (160 lb)   08/27/24 72.6 kg (160 lb)       Physical Exam:  In general: alert and in no acute distress.   HEENT: Carotid upstrokes normal with no bruits. JVP is normal.   Pulmonary: Clear to auscultation bilaterally.  Cardiovascular: S1,S2, regular.  II/VI systolic murmur at the apex.  Lower extremities: Warm. 2+ distal pulses. No edema.  Chronic venous  stasis changes noted.     Last Labs:  CBC -  Recent Labs     11/21/24  1115 08/09/24  1019 12/29/22  0613   WBC 7.6 5.4 18.2*   HGB 9.0* 9.5* 8.4*   HCT 30.5* 31.1* 27.3*    339 301   MCV 79* 79* 82       CMP -  Recent Labs     08/09/24  1019 02/23/23  1202 01/12/23  1149 12/29/22  0613 12/28/22  0550 12/27/22  1011 12/26/22  0406    142 138   < > 137 136 137   K 4.7 4.2 3.8   < > 3.8 3.7 3.8   * 112* 108*   < > 109* 107 108*   CO2 24 26 20*   < > 21 20* 19*   ANIONGAP 11 8* 14   < > 11 13 14   BUN 18 18 24*   < > 37* 30* 24*   CREATININE 0.80 0.80 0.92   < > 0.83 1.08* 0.95   EGFR 75  --   --   --   --   --   --    MG  --   --   --   --  1.86 1.71 1.60    < > = values in this interval not displayed.     Recent Labs     08/09/24  1019 01/12/23  1149 12/29/22  0613 12/27/22  1011 12/26/22  0406   ALBUMIN 3.7 4.2 3.0*   < > 3.8   ALKPHOS 100 100  --   --  92   ALT 10 12  --   --  10   AST 12 13  --   --  14   BILITOT 0.6 0.8  --   --  0.6    < > = values in this interval not displayed.       LIPID PANEL -   Recent Labs     08/09/24  1019 12/08/20  0928 12/20/19  1020 05/02/19  1034   CHOL 196 106 110 105   LDLCALC 137*  --   --   --    LDLF  --  41 47 46   HDL 39.6 33.0* 45.0 35.0*   TRIG 98 161* 89 121       Recent Labs     11/21/24  1115 03/16/20  2250 12/20/19  1020   BNP  --  188*  --    HGBA1C 5.9*  --  6.6           Assessment/Plan   1) CAD: PCI to the LAD 2014 and PCI to ramus 9/22/2022.  No signs or symptoms to suggest progression of underlying coronary disease.     2) chronic systolic heart failure: Continue with EP and heart failure.  She will wait to see if she may be a candidate for CCM through [facility].     3) dyslipidemia: LDL had been well-controlled for years however went up in August 2024.  Has not had a repeat lipid panel since that time.     4) hypertension: Blood pressure relatively well controlled. No changes needed     5) follow-up: 12 months or sooner if needed      Malachi Walter,  MD

## 2025-04-04 ENCOUNTER — TELEPHONE (OUTPATIENT)
Dept: CARDIOLOGY | Facility: CLINIC | Age: 80
End: 2025-04-04
Payer: MEDICARE

## 2025-04-04 NOTE — TELEPHONE ENCOUNTER
LM for patient that Dr. García would like her to have clearance from her PCP prior to proceeding with scheduled procedure on 04/09 given abnormal CBC. It does however look as though her blood work as trended this way for the last two years.

## 2025-04-07 ENCOUNTER — OFFICE VISIT (OUTPATIENT)
Dept: PRIMARY CARE | Facility: CLINIC | Age: 80
End: 2025-04-07
Payer: MEDICARE

## 2025-04-07 ENCOUNTER — APPOINTMENT (OUTPATIENT)
Dept: LAB | Facility: HOSPITAL | Age: 80
End: 2025-04-07
Payer: MEDICARE

## 2025-04-07 VITALS
WEIGHT: 142.38 LBS | RESPIRATION RATE: 16 BRPM | SYSTOLIC BLOOD PRESSURE: 154 MMHG | BODY MASS INDEX: 25.23 KG/M2 | HEIGHT: 63 IN | HEART RATE: 92 BPM | OXYGEN SATURATION: 96 % | TEMPERATURE: 98.1 F | DIASTOLIC BLOOD PRESSURE: 83 MMHG

## 2025-04-07 DIAGNOSIS — I50.22 CHRONIC SYSTOLIC HEART FAILURE: Primary | ICD-10-CM

## 2025-04-07 LAB
ABO GROUP (TYPE) IN BLOOD: NORMAL
ANTIBODY SCREEN: NORMAL
RH FACTOR (ANTIGEN D): NORMAL

## 2025-04-07 PROCEDURE — 1157F ADVNC CARE PLAN IN RCRD: CPT

## 2025-04-07 PROCEDURE — 1123F ACP DISCUSS/DSCN MKR DOCD: CPT

## 2025-04-07 PROCEDURE — 1159F MED LIST DOCD IN RCRD: CPT

## 2025-04-07 PROCEDURE — 86850 RBC ANTIBODY SCREEN: CPT

## 2025-04-07 PROCEDURE — 86900 BLOOD TYPING SEROLOGIC ABO: CPT

## 2025-04-07 PROCEDURE — 99213 OFFICE O/P EST LOW 20 MIN: CPT

## 2025-04-07 PROCEDURE — 3077F SYST BP >= 140 MM HG: CPT

## 2025-04-07 PROCEDURE — 3079F DIAST BP 80-89 MM HG: CPT

## 2025-04-07 PROCEDURE — 86901 BLOOD TYPING SEROLOGIC RH(D): CPT

## 2025-04-07 ASSESSMENT — ENCOUNTER SYMPTOMS
DIZZINESS: 0
NAUSEA: 0
LIGHT-HEADEDNESS: 0
VOMITING: 0
SHORTNESS OF BREATH: 1
FEVER: 0

## 2025-04-07 NOTE — ASSESSMENT & PLAN NOTE
Pt presenting for pre-op clearance for CCM device with Dr. García. She does have signs and symptoms correlating with CHF which should be improved post-op. She was cleared from a cardiac standpoint by Dr. García but did want her anemia evaluated by PCP. She has had chronic anemia for last two years and is at her baseline. RCIR of 3, 15% risk.  score of 16, 13.6% risk. Did order type/screen and PT/INR given anemia. Did confirm questions with patient in room for type/screen. Attending physician did speak to pt's PCP. Pt to follow up with PCP post-op.  Orders:    Type And Screen Is this order related to pregnancy or an upcoming surgery? Yes; Where will this surgery/delivery be performed? [facility]; What is the date of the surgery? 4/9/2025; Has this patient ever had a transfusion? No; Has this ...; Future    Protime-INR; Future

## 2025-04-07 NOTE — PROGRESS NOTES
I saw and evaluated the patient. I personally obtained the key and critical portions of the history and physical exam or was physically present for key and critical portions performed by the resident. I reviewed the resident/fellow's documentation and discussed the patient with the resident/fellow. I agree with the resident/fellow's medical decision making as documented in the note.  Patient has no chest pain or shortness of breath no dyspnea, patient states that she has been anemic around these numbers for some time now.  She has had other procedures in the past and tolerated well.  She does not have any leg swelling no dark stools no blood in her urine.  Had a discussion with the patient about her anemia versus her current symptoms.  She states she has waited 2 years for her device and she wants to have the surgery now.  She is aware of the risk as with any surgery.  But being anemic puts her at slightly higher risk.  Will get a type and screen and PT/INR so if any complications happen..  Patient and daughter are fine with the risk with proceeding..  Her risk with any procedure with anemia include disability, death, infection, the device not working.  They wish to proceed  Jaw pain no neck pain, tolerated procedures in the past  If any chest pain shortness of breath any nausea vomiting or fever headache any concerning symptom go to ER  Patient is acceptable to moderate risk due to age and anemia.  She does wish to proceed with the procedure  Agree with assessment and plan    Armando Alonso, DO

## 2025-04-07 NOTE — PROGRESS NOTES
Subjective   Gianna Parker is a 79 y.o. female who presents for Pre-op Exam (Pt here today for medical clearance for upcoming surgery on 4/9/25).  Pt presenting for CCM pre-op placement. She does mediport for vascular insufficiency. She denies any hx of bleeding or clotting disorders. She does have chronically low Hgb for two years at point. She denies any exertional dyspnea. She does have hx of CHF with reduced ejection fraction. She does take paxil which does wake her up at night. She does have orthopneic dyspnea and does need to sleep on two pillows 2/2 her heart failure. The procedure is to help her heart failure.  She is able to go up steps but does have to use the chair lift because she does get dyspneic and has poor balance.     Dr. García did clear pt for cardiac standpoint except for chronically low Hgb.     RCIR of 3, 15% risk   score of 16, 13.6% risk        Current Outpatient Medications on File Prior to Visit   Medication Sig Dispense Refill    albuterol (Ventolin HFA) 90 mcg/actuation inhaler Inhale 2 puffs every 6 hours if needed for wheezing. 18 g 11    benzonatate (Tessalon) 200 mg capsule TAKE 1 CAPSULE (200 MG) BY MOUTH 3 TIMES A DAY AS NEEDED FOR COUGH. DO NOT CRUSH OR CHEW. 60 capsule 3    ciprofloxacin (Cipro) 500 mg tablet Take 1 tablet (500 mg) by mouth once daily as needed (DENTAL WORK FOR HEART PROPHYKLAXIS). 14 tablet 0    Entresto 24-26 mg tablet TAKE 1 TABLET TWICE A DAY 60 tablet 11    EPINEPHrine 0.3 mg/0.3 mL injection syringe INJECT 0.3 ML (0.3 MG) INTO THE MUSCLE IF NEEDED FOR ANAPHYLAXIS. 1 each 3    ibuprofen 600 mg tablet Take 1 tablet (600 mg) by mouth 3 times a day. PRN      ipratropium (Atrovent) 42 mcg (0.06 %) nasal spray Administer 2 sprays into each nostril 4 times a day.      memantine (Namenda) 10 mg tablet Take 1 tablet (10 mg) by mouth 2 times a day.      metoprolol succinate XL (Toprol-XL) 25 mg 24 hr tablet Take 1 tablet (25 mg) by mouth once daily.       mirtazapine (Remeron) 15 mg tablet Take 1 tablet (15 mg) by mouth once daily at bedtime.      montelukast (Singulair) 10 mg tablet TAKE 1 TABLET BY MOUTH EVERY DAY 90 tablet 3    nitroglycerin (Nitrostat) 0.3 mg SL tablet Place 1 tablet (0.3 mg) under the tongue every 5 minutes if needed.      nystatin (Mycostatin) 100,000 unit/mL suspension Take 5 mL (500,000 Units) by mouth 3 times a day. For oral thrush 300 mL 1    omeprazole (PriLOSEC) 40 mg DR capsule TAKE 1 CAPSULE (40 MG) BY MOUTH TWICE A  capsule 3    oxybutynin XL (Ditropan-XL) 15 mg 24 hr tablet Take 1 tablet (15 mg) by mouth once daily. Do not crush, chew, or split. 90 tablet 3    PARoxetine (Paxil) 30 mg tablet Take 1 tablet (30 mg) by mouth once daily in the morning.      rosuvastatin (Crestor) 20 mg tablet Take 1 tablet (20 mg) by mouth once daily.      spironolactone (Aldactone) 25 mg tablet TAKE 1/2 TABLET BY MOUTH DAILY 45 tablet 3     No current facility-administered medications on file prior to visit.       Review of Systems   Constitutional:  Negative for fever.   Respiratory:  Positive for shortness of breath.         Dyspnea with exertion, orthopnea   Cardiovascular:  Negative for chest pain.   Gastrointestinal:  Negative for nausea and vomiting.   Neurological:  Negative for dizziness and light-headedness.   All other systems reviewed and are negative.      Vitals:    04/07/25 1344   BP: 154/83   Pulse: 92   Resp: 16   Temp: 36.7 °C (98.1 °F)   SpO2: 96%     Objective   Physical Exam  Vitals reviewed.   Constitutional:       General: She is not in acute distress.     Appearance: Normal appearance. She is not toxic-appearing.   HENT:      Head: Normocephalic and atraumatic.      Nose: Nose normal.   Eyes:      Extraocular Movements: Extraocular movements intact.   Cardiovascular:      Rate and Rhythm: Normal rate and regular rhythm.      Heart sounds: Murmur heard.      No friction rub. No gallop.      Comments: Murmur appreciated on  exam  Pulmonary:      Effort: Pulmonary effort is normal. No respiratory distress.      Breath sounds: Normal breath sounds. No wheezing, rhonchi or rales.   Skin:     General: Skin is warm and dry.   Neurological:      General: No focal deficit present.      Mental Status: She is alert.   Psychiatric:         Mood and Affect: Mood normal.         Behavior: Behavior normal.         Assessment & Plan  Chronic systolic heart failure  Pt presenting for pre-op clearance for CCM device with Dr. García. She does have signs and symptoms correlating with CHF which should be improved post-op. She was cleared from a cardiac standpoint by Dr. García but did want her anemia evaluated by PCP. She has had chronic anemia for last two years and is at her baseline. RCIR of 3, 15% risk.  score of 16, 13.6% risk. Did order type/screen and PT/INR given anemia. Did confirm questions with patient in room for type/screen. Attending physician did speak to pt's PCP. Pt to follow up with PCP post-op.  Orders:    Type And Screen Is this order related to pregnancy or an upcoming surgery? Yes; Where will this surgery/delivery be performed? [facility]; What is the date of the surgery? 4/9/2025; Has this patient ever had a transfusion? No; Has this ...; Future    Protime-INR; Future              Reg Lee, DO

## 2025-04-08 ENCOUNTER — APPOINTMENT (OUTPATIENT)
Dept: CARDIOLOGY | Facility: CLINIC | Age: 80
End: 2025-04-08
Payer: MEDICARE

## 2025-04-08 LAB
INR PPP: 1
PROTHROMBIN TIME: 10.7 SEC (ref 9–11.5)

## 2025-04-09 ENCOUNTER — APPOINTMENT (OUTPATIENT)
Dept: CARDIOLOGY | Facility: HOSPITAL | Age: 80
End: 2025-04-09
Payer: MEDICARE

## 2025-04-09 ENCOUNTER — APPOINTMENT (OUTPATIENT)
Dept: RADIOLOGY | Facility: HOSPITAL | Age: 80
End: 2025-04-09
Payer: MEDICARE

## 2025-04-09 ENCOUNTER — HOSPITAL ENCOUNTER (OUTPATIENT)
Facility: HOSPITAL | Age: 80
Setting detail: OUTPATIENT SURGERY
Discharge: HOME | End: 2025-04-09
Attending: INTERNAL MEDICINE | Admitting: INTERNAL MEDICINE
Payer: MEDICARE

## 2025-04-09 ENCOUNTER — ANESTHESIA (OUTPATIENT)
Dept: CARDIOLOGY | Facility: HOSPITAL | Age: 80
End: 2025-04-09
Payer: MEDICARE

## 2025-04-09 ENCOUNTER — ANESTHESIA EVENT (OUTPATIENT)
Dept: CARDIOLOGY | Facility: HOSPITAL | Age: 80
End: 2025-04-09
Payer: MEDICARE

## 2025-04-09 VITALS
HEART RATE: 79 BPM | TEMPERATURE: 98.2 F | DIASTOLIC BLOOD PRESSURE: 84 MMHG | WEIGHT: 141.54 LBS | BODY MASS INDEX: 25.08 KG/M2 | HEIGHT: 63 IN | RESPIRATION RATE: 18 BRPM | SYSTOLIC BLOOD PRESSURE: 184 MMHG

## 2025-04-09 DIAGNOSIS — I50.20 HFREF (HEART FAILURE WITH REDUCED EJECTION FRACTION): Primary | ICD-10-CM

## 2025-04-09 DIAGNOSIS — I25.5 CARDIOMYOPATHY, ISCHEMIC: ICD-10-CM

## 2025-04-09 DIAGNOSIS — G89.18 POST-OP PAIN: ICD-10-CM

## 2025-04-09 LAB
ATRIAL RATE: 86 BPM
ERYTHROCYTE [DISTWIDTH] IN BLOOD BY AUTOMATED COUNT: 18.6 % (ref 11.5–14.5)
HCT VFR BLD AUTO: 30.8 % (ref 36–46)
HGB BLD-MCNC: 9.3 G/DL (ref 12–16)
MCH RBC QN AUTO: 23.3 PG (ref 26–34)
MCHC RBC AUTO-ENTMCNC: 30.2 G/DL (ref 32–36)
MCV RBC AUTO: 77 FL (ref 80–100)
NRBC BLD-RTO: 0 /100 WBCS (ref 0–0)
P AXIS: 39 DEGREES
P OFFSET: 167 MS
P ONSET: 102 MS
PLATELET # BLD AUTO: 358 X10*3/UL (ref 150–450)
PR INTERVAL: 172 MS
Q ONSET: 188 MS
QRS COUNT: 14 BEATS
QRS DURATION: 282 MS
QT INTERVAL: 496 MS
QTC CALCULATION(BAZETT): 593 MS
QTC FREDERICIA: 559 MS
R AXIS: -67 DEGREES
RBC # BLD AUTO: 4 X10*6/UL (ref 4–5.2)
T AXIS: 42 DEGREES
T OFFSET: 436 MS
VENTRICULAR RATE: 86 BPM
WBC # BLD AUTO: 7.1 X10*3/UL (ref 4.4–11.3)

## 2025-04-09 PROCEDURE — 75860 VEIN X-RAY NECK: CPT | Performed by: INTERNAL MEDICINE

## 2025-04-09 PROCEDURE — 2500000001 HC RX 250 WO HCPCS SELF ADMINISTERED DRUGS (ALT 637 FOR MEDICARE OP): Performed by: NURSE PRACTITIONER

## 2025-04-09 PROCEDURE — 7100000002 HC RECOVERY ROOM TIME - EACH INCREMENTAL 1 MINUTE: Performed by: INTERNAL MEDICINE

## 2025-04-09 PROCEDURE — 2780000003 HC OR 278 NO HCPCS: Performed by: INTERNAL MEDICINE

## 2025-04-09 PROCEDURE — 2500000005 HC RX 250 GENERAL PHARMACY W/O HCPCS: Performed by: INTERNAL MEDICINE

## 2025-04-09 PROCEDURE — C1898 LEAD, PMKR, OTHER THAN TRANS: HCPCS | Performed by: INTERNAL MEDICINE

## 2025-04-09 PROCEDURE — 71046 X-RAY EXAM CHEST 2 VIEWS: CPT

## 2025-04-09 PROCEDURE — C1892 INTRO/SHEATH,FIXED,PEEL-AWAY: HCPCS | Performed by: INTERNAL MEDICINE

## 2025-04-09 PROCEDURE — 2720000007 HC OR 272 NO HCPCS: Performed by: INTERNAL MEDICINE

## 2025-04-09 PROCEDURE — 71046 X-RAY EXAM CHEST 2 VIEWS: CPT | Performed by: RADIOLOGY

## 2025-04-09 PROCEDURE — 7100000001 HC RECOVERY ROOM TIME - INITIAL BASE CHARGE: Performed by: INTERNAL MEDICINE

## 2025-04-09 PROCEDURE — C1894 INTRO/SHEATH, NON-LASER: HCPCS | Performed by: INTERNAL MEDICINE

## 2025-04-09 PROCEDURE — 0408T INSJ/RPLC CARDIAC MODULJ SYS: CPT | Mod: 22 | Performed by: INTERNAL MEDICINE

## 2025-04-09 PROCEDURE — 36415 COLL VENOUS BLD VENIPUNCTURE: CPT | Performed by: NURSE PRACTITIONER

## 2025-04-09 PROCEDURE — 85027 COMPLETE CBC AUTOMATED: CPT | Performed by: NURSE PRACTITIONER

## 2025-04-09 PROCEDURE — C1769 GUIDE WIRE: HCPCS | Performed by: INTERNAL MEDICINE

## 2025-04-09 PROCEDURE — 7100000010 HC PHASE TWO TIME - EACH INCREMENTAL 1 MINUTE: Performed by: INTERNAL MEDICINE

## 2025-04-09 PROCEDURE — 3700000002 HC GENERAL ANESTHESIA TIME - EACH INCREMENTAL 1 MINUTE: Performed by: INTERNAL MEDICINE

## 2025-04-09 PROCEDURE — 2500000004 HC RX 250 GENERAL PHARMACY W/ HCPCS (ALT 636 FOR OP/ED): Performed by: NURSE ANESTHETIST, CERTIFIED REGISTERED

## 2025-04-09 PROCEDURE — 3700000001 HC GENERAL ANESTHESIA TIME - INITIAL BASE CHARGE: Performed by: INTERNAL MEDICINE

## 2025-04-09 PROCEDURE — 2500000004 HC RX 250 GENERAL PHARMACY W/ HCPCS (ALT 636 FOR OP/ED): Performed by: INTERNAL MEDICINE

## 2025-04-09 PROCEDURE — 93005 ELECTROCARDIOGRAM TRACING: CPT

## 2025-04-09 PROCEDURE — 7100000009 HC PHASE TWO TIME - INITIAL BASE CHARGE: Performed by: INTERNAL MEDICINE

## 2025-04-09 PROCEDURE — 0408T INSJ/RPLC CARDIAC MODULJ SYS: CPT | Performed by: INTERNAL MEDICINE

## 2025-04-09 PROCEDURE — C1786 PMKR, SINGLE, RATE-RESP: HCPCS | Performed by: INTERNAL MEDICINE

## 2025-04-09 DEVICE — OPTIMIZER SMART MINI IMPLANTABLE PULSE GENERATOR FOR THE TREATMENT OF HEART FAILURESYSTEM CONSISTS OF:(1) OPTIMIZER SMART MINI IMPLANTABLE PULSE GENERATOR (IPG)(1) ALLEN #2 TORQUE WRENCH(1) PORT PLUG
Type: IMPLANTABLE DEVICE | Site: CHEST | Status: FUNCTIONAL
Brand: OPTIMIZER

## 2025-04-09 DEVICE — PACING LEAD
Type: IMPLANTABLE DEVICE | Site: CHEST | Status: FUNCTIONAL
Brand: TENDRIL™

## 2025-04-09 RX ORDER — LIDOCAINE HYDROCHLORIDE 10 MG/ML
INJECTION, SOLUTION EPIDURAL; INFILTRATION; INTRACAUDAL; PERINEURAL AS NEEDED
Status: DISCONTINUED | OUTPATIENT
Start: 2025-04-09 | End: 2025-04-09 | Stop reason: HOSPADM

## 2025-04-09 RX ORDER — TRAMADOL HYDROCHLORIDE 50 MG/1
50 TABLET ORAL EVERY 6 HOURS PRN
Status: DISCONTINUED | OUTPATIENT
Start: 2025-04-09 | End: 2025-04-09 | Stop reason: HOSPADM

## 2025-04-09 RX ORDER — ONDANSETRON 4 MG/1
4 TABLET, FILM COATED ORAL EVERY 8 HOURS PRN
Status: DISCONTINUED | OUTPATIENT
Start: 2025-04-09 | End: 2025-04-09 | Stop reason: HOSPADM

## 2025-04-09 RX ORDER — CHLORHEXIDINE GLUCONATE 40 MG/ML
SOLUTION TOPICAL ONCE
Status: COMPLETED | OUTPATIENT
Start: 2025-04-09 | End: 2025-04-09

## 2025-04-09 RX ORDER — ACETAMINOPHEN 325 MG/1
650 TABLET ORAL EVERY 4 HOURS PRN
Status: DISCONTINUED | OUTPATIENT
Start: 2025-04-09 | End: 2025-04-09 | Stop reason: HOSPADM

## 2025-04-09 RX ORDER — ACETAMINOPHEN 160 MG/5ML
650 SOLUTION ORAL EVERY 4 HOURS PRN
Status: DISCONTINUED | OUTPATIENT
Start: 2025-04-09 | End: 2025-04-09 | Stop reason: HOSPADM

## 2025-04-09 RX ORDER — MUPIROCIN 20 MG/G
1 OINTMENT TOPICAL ONCE
Status: COMPLETED | OUTPATIENT
Start: 2025-04-09 | End: 2025-04-09

## 2025-04-09 RX ORDER — BUPIVACAINE HYDROCHLORIDE 2.5 MG/ML
INJECTION, SOLUTION EPIDURAL; INFILTRATION; INTRACAUDAL; PERINEURAL AS NEEDED
Status: DISCONTINUED | OUTPATIENT
Start: 2025-04-09 | End: 2025-04-09 | Stop reason: HOSPADM

## 2025-04-09 RX ORDER — PROPOFOL 10 MG/ML
INJECTION, EMULSION INTRAVENOUS CONTINUOUS PRN
Status: DISCONTINUED | OUTPATIENT
Start: 2025-04-09 | End: 2025-04-09

## 2025-04-09 RX ORDER — VANCOMYCIN HYDROCHLORIDE 1 G/200ML
1000 INJECTION, SOLUTION INTRAVENOUS ONCE
Status: COMPLETED | OUTPATIENT
Start: 2025-04-09 | End: 2025-04-09

## 2025-04-09 RX ORDER — ACETAMINOPHEN 325 MG/1
650 TABLET ORAL EVERY 4 HOURS PRN
Start: 2025-04-09

## 2025-04-09 RX ORDER — TRAMADOL HYDROCHLORIDE 50 MG/1
50 TABLET ORAL EVERY 8 HOURS PRN
Qty: 10 TABLET | Refills: 0 | Status: SHIPPED | OUTPATIENT
Start: 2025-04-09

## 2025-04-09 RX ORDER — ONDANSETRON HYDROCHLORIDE 2 MG/ML
4 INJECTION, SOLUTION INTRAVENOUS EVERY 8 HOURS PRN
Status: DISCONTINUED | OUTPATIENT
Start: 2025-04-09 | End: 2025-04-09 | Stop reason: HOSPADM

## 2025-04-09 RX ORDER — ACETAMINOPHEN 650 MG/1
650 SUPPOSITORY RECTAL EVERY 4 HOURS PRN
Status: DISCONTINUED | OUTPATIENT
Start: 2025-04-09 | End: 2025-04-09 | Stop reason: HOSPADM

## 2025-04-09 RX ADMIN — PROPOFOL 30 MG: 10 INJECTION, EMULSION INTRAVENOUS at 12:03

## 2025-04-09 RX ADMIN — PROPOFOL 30 MG: 10 INJECTION, EMULSION INTRAVENOUS at 12:20

## 2025-04-09 RX ADMIN — PROPOFOL 30 MG: 10 INJECTION, EMULSION INTRAVENOUS at 13:01

## 2025-04-09 RX ADMIN — PROPOFOL 35 MG: 10 INJECTION, EMULSION INTRAVENOUS at 11:54

## 2025-04-09 RX ADMIN — PROPOFOL 35 MG: 10 INJECTION, EMULSION INTRAVENOUS at 11:45

## 2025-04-09 RX ADMIN — MUPIROCIN 1 APPLICATION: 20 OINTMENT TOPICAL at 09:32

## 2025-04-09 RX ADMIN — ACETAMINOPHEN 650 MG: 325 TABLET ORAL at 18:00

## 2025-04-09 RX ADMIN — SODIUM CHLORIDE: 9 INJECTION, SOLUTION INTRAVENOUS at 13:00

## 2025-04-09 RX ADMIN — VANCOMYCIN HYDROCHLORIDE 1000 MG: 1 INJECTION, SOLUTION INTRAVENOUS at 09:58

## 2025-04-09 RX ADMIN — Medication: at 09:32

## 2025-04-09 RX ADMIN — PROPOFOL 30 MG: 10 INJECTION, EMULSION INTRAVENOUS at 12:48

## 2025-04-09 RX ADMIN — PROPOFOL 75 MCG/KG/MIN: 10 INJECTION, EMULSION INTRAVENOUS at 11:44

## 2025-04-09 RX ADMIN — TRAMADOL HYDROCHLORIDE 50 MG: 50 TABLET, FILM COATED ORAL at 14:20

## 2025-04-09 SDOH — HEALTH STABILITY: MENTAL HEALTH: CURRENT SMOKER: 0

## 2025-04-09 ASSESSMENT — PAIN SCALES - GENERAL
PAIN_LEVEL: 0
PAINLEVEL_OUTOF10: 6
PAINLEVEL_OUTOF10: 7
PAINLEVEL_OUTOF10: 7
PAINLEVEL_OUTOF10: 6
PAINLEVEL_OUTOF10: 0 - NO PAIN
PAINLEVEL_OUTOF10: 7
PAINLEVEL_OUTOF10: 4

## 2025-04-09 ASSESSMENT — PAIN - FUNCTIONAL ASSESSMENT
PAIN_FUNCTIONAL_ASSESSMENT: 0-10

## 2025-04-09 ASSESSMENT — COLUMBIA-SUICIDE SEVERITY RATING SCALE - C-SSRS
2. HAVE YOU ACTUALLY HAD ANY THOUGHTS OF KILLING YOURSELF?: NO
6. HAVE YOU EVER DONE ANYTHING, STARTED TO DO ANYTHING, OR PREPARED TO DO ANYTHING TO END YOUR LIFE?: NO
1. IN THE PAST MONTH, HAVE YOU WISHED YOU WERE DEAD OR WISHED YOU COULD GO TO SLEEP AND NOT WAKE UP?: NO

## 2025-04-09 ASSESSMENT — PAIN DESCRIPTION - LOCATION: LOCATION: CHEST

## 2025-04-09 ASSESSMENT — PAIN DESCRIPTION - ORIENTATION: ORIENTATION: LEFT

## 2025-04-09 NOTE — ANESTHESIA PROCEDURE NOTES
Peripheral IV  Date/Time: 4/9/2025 12:18 PM  Inserted by: Serge Antunez DO    Placement  Needle size: 22 G  Laterality: right  Location: hand  Local anesthetic: none  Site prep: alcohol  Technique: anatomical landmarks  Attempts: 1

## 2025-04-09 NOTE — NURSING NOTE
Call received from [facility] Lui. Plan is to order a chest xray, once that is completed, the patint may be discharged home, do not need to wait for the results prior to discharge.

## 2025-04-09 NOTE — ANESTHESIA PREPROCEDURE EVALUATION
Patient: Gianan Parker    Procedure Information       Date/Time: 04/09/25 1100    Procedure: CARDIAC CONTRACTILITY INSERTION/REPLACEMENT GENERATOR AND ELECTRODES (Left: Chest) - [facility] leads, venogram first    Location: [facility]    Providers: Margaret García MD            Relevant Problems   Anesthesia   (+) Poor venous access      Cardiac   (+) Benign essential hypertension   (+) Coronary artery disease involving native coronary artery of native heart with angina pectoris   (+) Nonspecific abnormal electrocardiogram (ECG) (EKG)      Pulmonary   (+) Asthmatic bronchitis (HHS-HCC)   (+) Chronic obstructive pulmonary disease, unspecified COPD type (Multi)      Neuro   (+) Anxiety and depression   (+) Depression   (+) Lumbar radiculopathy, right   (+) Major depressive disorder, recurrent severe without psychotic features (Multi)   (+) Sciatica      GI   (+) Gastroesophageal reflux disease      Liver   (+) Chronic pancreatitis (Multi)   (+) Chronic recurrent pancreatitis (Multi)      Hematology   (+) Anemia      Musculoskeletal   (+) Generalized osteoarthritis   (+) Osteoarthritis of shoulder      ID   (+) Recurrent URI (upper respiratory infection)   (+) Thrush      Skin   (+) Chronic eczematous otitis externa of both ears       Clinical information reviewed:   Tobacco  Allergies  Meds   Med Hx  Surg Hx   Fam Hx  Soc Hx        NPO Detail:  NPO/Void Status  Date of Last Liquid: 04/09/25  Time of Last Liquid: 0700  Date of Last Solid: 04/08/25  Time of Last Solid: 2100  Last Intake Type: Clear fluids  Time of Last Void: 0700         Physical Exam    Airway  Mallampati: II  Neck ROM: limited     Cardiovascular    Dental - normal exam     Pulmonary - normal exam     Abdominal            Anesthesia Plan    History of general anesthesia?: yes  History of complications of general anesthesia?: no    ASA 3     MAC     The patient is not a current smoker.    intravenous induction    Anesthetic plan and risks discussed with patient.  Use of blood products discussed with patient who consented to blood products.    Plan discussed with attending.

## 2025-04-09 NOTE — NURSING NOTE
Bedside hand off report received from off-going RNBreonna. Patient is S/P Cardiac Contractility Device Insertion via lt chestwall. Has aquacell dressing to Lt chest, dry/intact. Has immobilizer sling in place to lt arm and ice pack over dressing. Patient A/O x4, states pain is 5/10, improving from 7/10 when Tramadol was given. Patient repositioned into high Sanchez's position. IV converted to saline lock. Tele monitor in place: 1:1 AV paced at 100%, rate 90's. Family at bedside. Call bell within reach.

## 2025-04-09 NOTE — ANESTHESIA POSTPROCEDURE EVALUATION
Patient: Gianna Parker    Procedure Summary       Date: 04/09/25 Room / Location: [facility]    Anesthesia Start: 1133 Anesthesia Stop: 1326    Procedure: CARDIAC CONTRACTILITY INSERTION/REPLACEMENT GENERATOR AND ELECTRODES (Left: Chest) Diagnosis:       HFrEF (heart failure with reduced ejection fraction)      Cardiomyopathy, ischemic    Providers: Margaret García MD Responsible Provider: Ga Olmedo MD    Anesthesia Type: MAC ASA Status: 3            Anesthesia Type: MAC    Vitals Value Taken Time   /80 04/09/25 1326   Temp 36 04/09/25 1326   Pulse 97 04/09/25 1326   Resp 16 04/09/25 1326   SpO2 98 04/09/25 1326       Anesthesia Post Evaluation    Patient location during evaluation: PACU  Patient participation: complete - patient participated  Level of consciousness: awake and alert  Pain score: 0  Pain management: adequate  Airway patency: patent  Cardiovascular status: acceptable  Respiratory status: acceptable  Hydration status: acceptable  Postoperative Nausea and Vomiting: none        No notable events documented.

## 2025-04-09 NOTE — INTERVAL H&P NOTE
H&P reviewed. The patient was examined and there are no changes to the H&P.    In addition,  she is anxious about procedure.    Lab Results   Component Value Date    WBC 7.1 04/09/2025    HGB 9.3 (L) 04/09/2025    HCT 30.8 (L) 04/09/2025    MCV 77 (L) 04/09/2025     04/09/2025      Lab Results   Component Value Date    GLUCOSE 124 (H) 04/03/2025    CALCIUM 10.0 04/03/2025     04/03/2025    K 3.8 04/03/2025    CO2 25 04/03/2025     04/03/2025    BUN 14 04/03/2025    CREATININE 0.75 04/03/2025      Lab Results   Component Value Date    INR 1.0 04/07/2025    INR 1.1 04/03/2025    INR 1.8 (H) 12/26/2022    PROTIME 10.7 04/07/2025    PROTIME 11.9 04/03/2025    PROTIME 20.6 (H) 12/26/2022      Counseling greater than 50% of the visit performed.  The patient I discussed preoperative cardiac evaluation, shared decision making, [place] decision tool, treatment options, risks, benefits, and imponderables.  All questions answered in detail. Consent confirmed. Patient appreciative of care

## 2025-04-09 NOTE — NURSING NOTE
Patient and family given post-procedure discharge instructions regarding site care, follow-up appointments and home device monitor. Verbalized understanding. Patient able to get up to bedside, denies any dizziness, light-headedness. IV from Rt wrist removed, had been placed while in EP lab and bilat AC IV's were already removed while in lab. Patient assisted to get dressed, demonstrated use of immobilizer and how to wear it. Discussed last dose of tramadol and tylenol last given at 1800 and how to alternate each medication every 4 hours as needed. Patient discharged to home via w/c.

## 2025-04-09 NOTE — DISCHARGE INSTRUCTIONS
Home going instructions after Cardiac Contractility Implant    After a procedure using sedation  You should return home and rest for the remainder of the day and evening.   It is recommended a responsible adult be with you for the first 24 hours after the procedure.  Do not make any legal decisions for 24 hours after your procedure.  Do not drink alcoholic beverages for 24 hours after your procedure.    Wound care  Leave the surgical dressing in place for 7 days post implant  The dressing will be removed at the 1 week follow up appointment.    Please keep your incision dry, the dressing is water resistant.    You may shower avoid water directly hitting the dressing.     Inspect your incisional site/ dressing each day  Apply ice to the site 3-4 times per day in 20 minute intervals for at least 2 days after surgery  It is normal for the area around the incision to be tender for a few weeks following surgery.     Pain relievers such as Tylenol or Motrin are usually sufficient for pain relief.    You may be prescribed Tramadol in addition.  Take as prescribed alternating with Tylenol or Motrin.    Activity  Avoid driving for 1 week.  If you have had passing out spells or previously restricted from driving, discuss driving restrictions with your doctor.  For the first 4 to 6 weeks after implant avoid excessive/repetitive arm movement on the side of your new implant.    Do not raise, push, pull, or stretch your arm above shoulder level.    Do not  items that weigh greater than 10 lbs with the device arm.    Wear your binder/sling for the first 48 hours then at night to remind you not to move the arm over your head and during the day as needed.    Report to your provider   Increased redness, swelling, drainage or gaping of your incisional site.  Increased pain at site unrelieved by pain medication.  Fever or chills prior to the 1 week appointment.  Bright red bleeding from the incisional site or complete  saturation of the dressing.  Dizziness, lightheadedness, passing out, or defibrillator shocks.    Remote monitoring/ Device ID card  You have been instructed by the device company representative regarding remote home monitoring.   There are multiple types of home monitoring units, please follow the instructions given  If you have questions please contact the device company for further instruction  After implant you will receive a temporary card.    Permanent card will come in the mail in the next few weeks.  It is important that you carry your pacemaker ID card with you at all times.    Inform all doctors and healthcare providers that you have a pacemaker.      Electromagnetic Interference:    Microwave ovens are safe to use.    Please inform any physicians of your pacemaker implant prior to MRI for appropriate scheduling.    You should be prepared to show your pacemaker identification card to the security guards at metal detectors.    Hand wand detector should be kept away from the pacemaker.    Read the patient booklet for more information.      Follow up appointments  Incision (wound) check in 1 week.  Appointment for provider in 3 months.  These appointments will be scheduled and appear on your After Visit Summary.

## 2025-04-15 DIAGNOSIS — I50.22 CHRONIC SYSTOLIC (CONGESTIVE) HEART FAILURE: ICD-10-CM

## 2025-04-15 RX ORDER — SACUBITRIL AND VALSARTAN 24; 26 MG/1; MG/1
1 TABLET, FILM COATED ORAL 2 TIMES DAILY
Qty: 60 TABLET | Refills: 11 | Status: SHIPPED | OUTPATIENT
Start: 2025-04-15

## 2025-04-16 ENCOUNTER — APPOINTMENT (OUTPATIENT)
Dept: CARDIOLOGY | Facility: CLINIC | Age: 80
End: 2025-04-16
Payer: MEDICARE

## 2025-04-16 VITALS
DIASTOLIC BLOOD PRESSURE: 68 MMHG | WEIGHT: 139 LBS | HEART RATE: 96 BPM | BODY MASS INDEX: 24.62 KG/M2 | SYSTOLIC BLOOD PRESSURE: 104 MMHG | TEMPERATURE: 97.4 F

## 2025-04-16 DIAGNOSIS — I50.20 HFREF (HEART FAILURE WITH REDUCED EJECTION FRACTION): ICD-10-CM

## 2025-04-16 DIAGNOSIS — I25.5 CARDIOMYOPATHY, ISCHEMIC: ICD-10-CM

## 2025-04-16 NOTE — PROGRESS NOTES
Patient present for wound check. Patient had dual lead cardiac contractility device placed on 4/9/25 at [facility] with Dr García for Heart failure and ischemic cardiomyopathy.    Bandage was removed, Steri strips intact, no ecchymosis present, no signs or symptoms of infection.

## 2025-04-17 LAB
ATRIAL RATE: 86 BPM
P AXIS: 39 DEGREES
P OFFSET: 167 MS
P ONSET: 102 MS
PR INTERVAL: 172 MS
Q ONSET: 188 MS
QRS COUNT: 14 BEATS
QRS DURATION: 282 MS
QT INTERVAL: 496 MS
QTC CALCULATION(BAZETT): 593 MS
QTC FREDERICIA: 559 MS
R AXIS: -67 DEGREES
T AXIS: 42 DEGREES
T OFFSET: 436 MS
VENTRICULAR RATE: 86 BPM

## 2025-04-24 ENCOUNTER — OFFICE VISIT (OUTPATIENT)
Dept: CARDIOLOGY | Facility: CLINIC | Age: 80
End: 2025-04-24
Payer: MEDICARE

## 2025-04-24 VITALS
BODY MASS INDEX: 25.52 KG/M2 | OXYGEN SATURATION: 98 % | HEIGHT: 63 IN | HEART RATE: 86 BPM | SYSTOLIC BLOOD PRESSURE: 122 MMHG | DIASTOLIC BLOOD PRESSURE: 72 MMHG | WEIGHT: 144 LBS

## 2025-04-24 DIAGNOSIS — I25.119 CORONARY ARTERY DISEASE INVOLVING NATIVE CORONARY ARTERY OF NATIVE HEART WITH ANGINA PECTORIS: Primary | ICD-10-CM

## 2025-04-24 DIAGNOSIS — I10 BENIGN ESSENTIAL HYPERTENSION: ICD-10-CM

## 2025-04-24 DIAGNOSIS — I50.22 CHRONIC SYSTOLIC HEART FAILURE: ICD-10-CM

## 2025-04-24 DIAGNOSIS — E78.5 DYSLIPIDEMIA: ICD-10-CM

## 2025-04-24 PROCEDURE — 1157F ADVNC CARE PLAN IN RCRD: CPT | Performed by: INTERNAL MEDICINE

## 2025-04-24 PROCEDURE — 3078F DIAST BP <80 MM HG: CPT | Performed by: INTERNAL MEDICINE

## 2025-04-24 PROCEDURE — 1126F AMNT PAIN NOTED NONE PRSNT: CPT | Performed by: INTERNAL MEDICINE

## 2025-04-24 PROCEDURE — 1159F MED LIST DOCD IN RCRD: CPT | Performed by: INTERNAL MEDICINE

## 2025-04-24 PROCEDURE — 1036F TOBACCO NON-USER: CPT | Performed by: INTERNAL MEDICINE

## 2025-04-24 PROCEDURE — 99214 OFFICE O/P EST MOD 30 MIN: CPT | Performed by: INTERNAL MEDICINE

## 2025-04-24 PROCEDURE — 1160F RVW MEDS BY RX/DR IN RCRD: CPT | Performed by: INTERNAL MEDICINE

## 2025-04-24 PROCEDURE — 3074F SYST BP LT 130 MM HG: CPT | Performed by: INTERNAL MEDICINE

## 2025-04-24 PROCEDURE — 1123F ACP DISCUSS/DSCN MKR DOCD: CPT | Performed by: INTERNAL MEDICINE

## 2025-04-24 RX ORDER — ROSUVASTATIN CALCIUM 20 MG/1
20 TABLET, COATED ORAL DAILY
Qty: 90 TABLET | Refills: 3 | Status: SHIPPED | OUTPATIENT
Start: 2025-04-24 | End: 2026-04-24

## 2025-04-24 ASSESSMENT — PAIN SCALES - GENERAL: PAINLEVEL_OUTOF10: 0-NO PAIN

## 2025-04-24 NOTE — PROGRESS NOTES
Chief Complaint:   No chief complaint on file.     History Of Present Illness:    Gianna Parker is a 79 y.o. female with a history of CAD s/p MI (PCI to the LAD  and PCI to ramus 2022), chronic systolic heart failure s/p CCM, dyslipidemia, obesity, and hypertension here for follow-up.     Tells me that she has been feeling much better ever since having the CCM placed.  She says that she is able to walk longer distances without shortness of breath.  She also believes that the intermittent lightheadedness she was experiencing has improved.  She has had no significant leg swelling.  Overall she is very pleased with her progress.      CCM placement 25 at [facility]    Echocardiogram 2024: EF 40 to 45%.  Grade I diastolic dysfunction hypokinesis of the mid to distal septum and apex.  Moderately thickened LV.  Moderate to severe MAC.  Mild MR and TR.  RVSP 31 mmHg.     Echocardiogram 2022: EF 35 to 40%. Hypokinesis of the apex. Grade 1 diastolic dysfunction. RVSP normal at 25 mmHg.     PCI to the ramus 2022: Resolute Steve 2.5 x 8 mm DEBBY     Catheterization 2015: Left main with no significant disease. LAD with widely patent previously placed stents. LCx is dominant with no significant stenosis. Ramus intermedius has an ostial 50% stenosis. RCA is small and nondominant.     PCI of LAD 3/16/2014: LAD stented with Promus 2.5 x 24 mm DEBBY      Past Medical History:  She has a past medical history of Asthma, CHF (congestive heart failure), Coronary artery disease, Hyperlipidemia, Hypertension, and Ischemic cardiomyopathy.    Past Surgical History:  She has a past surgical history that includes Tonsilectomy, adenoidectomy, bilateral myringotomy and tubes;  section, classic; Hysterectomy; Pancreas surgery; Shoulder surgery (Bilateral); Knee surgery (Bilateral); Carotid stent; Pancreas surgery; Coronary angioplasty; Cardiac catheterization; and Cardiac electrophysiology procedure (Left,  4/9/2025).      Social History:  She reports that she has never smoked. She has never used smokeless tobacco. She reports that she does not drink alcohol and does not use drugs.    Family History:  Family History   Problem Relation Name Age of Onset    Coronary artery disease Mother      Coronary artery disease Father      Coronary artery disease Other sibling         Allergies:  Bee venom protein (honey bee), Sulfa (sulfonamide antibiotics), Bacitracin, Clindamycin, Fish containing products, Nitrofurantoin macrocrystal, Oxycodone, Shellfish containing products, Tetracycline, Nitrofurantoin, Penicillins, and Promethazine    Outpatient Medications:  Current Outpatient Medications   Medication Instructions    acetaminophen (TYLENOL) 650 mg, oral, Every 4 hours PRN    albuterol (Ventolin HFA) 90 mcg/actuation inhaler 2 puffs, inhalation, Every 6 hours PRN    benzonatate (TESSALON) 200 mg, oral, 3 times daily PRN, Do not crush or chew.    ciprofloxacin (CIPRO) 500 mg, oral, Daily PRN    Entresto 24-26 mg tablet 1 tablet, oral, 2 times daily    EPINEPHrine (EPIPEN) 0.3 mg, intramuscular, As needed    ibuprofen 600 mg tablet 1 tablet, As needed    ipratropium (Atrovent) 42 mcg (0.06 %) nasal spray 2 sprays, 4 times daily    memantine (NAMENDA) 10 mg, 2 times daily    metoprolol succinate XL (TOPROL-XL) 25 mg, Daily    mirtazapine (Remeron) 15 mg tablet 1 tablet, Nightly    montelukast (SINGULAIR) 10 mg, oral, Daily    nitroglycerin (Nitrostat) 0.3 mg SL tablet 1 tablet, Every 5 min PRN    nystatin (MYCOSTATIN) 500,000 Units, oral, 3 times daily, For oral thrush    omeprazole (PRILOSEC) 40 mg, oral, 2 times daily    oxybutynin XL (DITROPAN-XL) 15 mg, oral, Daily, Do not crush, chew, or split.    PARoxetine (PAXIL) 30 mg, Every morning    rosuvastatin (CRESTOR) 20 mg, Daily    spironolactone (ALDACTONE) 12.5 mg, oral, Daily    traMADol (ULTRAM) 50 mg, oral, Every 8 hours PRN, If no relief from tylenol then alternate tylenol  "with Tramadol       Last Recorded Vitals:  Visit Vitals  /72 (BP Location: Left arm, Patient Position: Sitting, BP Cuff Size: Adult)   Pulse 86   Ht 1.6 m (5' 3\")   Wt 65.3 kg (144 lb)   SpO2 98%   BMI 25.51 kg/m²   Smoking Status Never   BSA 1.7 m²      LASTWT(3):   Wt Readings from Last 3 Encounters:   04/24/25 65.3 kg (144 lb)   04/16/25 63 kg (139 lb)   04/09/25 64.2 kg (141 lb 8.6 oz)       Physical Exam:  In general: alert and in no acute distress.   HEENT: Carotid upstrokes normal with no bruits. JVP is normal.   Pulmonary: Clear to auscultation bilaterally.  Cardiovascular: S1,S2, regular.  II/VI systolic murmur at the apex.  Lower extremities: Warm. 2+ distal pulses. No edema.  Chronic venous stasis changes noted.     Last Labs:  CBC -  Recent Labs     04/09/25  0914 04/03/25  1333 11/21/24  1115   WBC 7.1 7.1 7.6   HGB 9.3* 8.9* 9.0*   HCT 30.8* 30.6* 30.5*    381 379   MCV 77* 78* 79*       CMP -  Recent Labs     04/03/25  1333 08/09/24  1019 02/23/23  1202 12/29/22  0613 12/28/22  0550 12/27/22  1011 12/26/22  0406    140 142   < > 137 136 137   K 3.8 4.7 4.2   < > 3.8 3.7 3.8    110* 112*   < > 109* 107 108*   CO2 25 24 26   < > 21 20* 19*   ANIONGAP 13 11 8*   < > 11 13 14   BUN 14 18 18   < > 37* 30* 24*   CREATININE 0.75 0.80 0.80   < > 0.83 1.08* 0.95   EGFR 81 75  --   --   --   --   --    MG  --   --   --   --  1.86 1.71 1.60    < > = values in this interval not displayed.     Recent Labs     08/09/24  1019 01/12/23  1149 12/29/22  0613 12/27/22  1011 12/26/22  0406   ALBUMIN 3.7 4.2 3.0*   < > 3.8   ALKPHOS 100 100  --   --  92   ALT 10 12  --   --  10   AST 12 13  --   --  14   BILITOT 0.6 0.8  --   --  0.6    < > = values in this interval not displayed.       LIPID PANEL -   Recent Labs     08/09/24  1019 12/08/20  0928 12/20/19  1020 05/02/19  1034   CHOL 196 106 110 105   LDLCALC 137*  --   --   --    LDLF  --  41 47 46   HDL 39.6 33.0* 45.0 35.0*   TRIG 98 161* 89 " 121       Recent Labs     11/21/24  1115 03/16/20  2250 12/20/19  1020   BNP  --  188*  --    HGBA1C 5.9*  --  6.6           Assessment/Plan   1) CAD: PCI to the ramus 2022 and PCI to the LAD 2014.  No signs or symptoms to suggest progression of underlying coronary disease.  Based on the ISCHEMIA Trial there is no need for routine stress testing.       2) chronic systolic heart failure: Doing much better since the CCM placement.  Would recommend continuing with the Entresto 24/26 mg twice daily, Toprol-XL 25 mg daily, and spironolactone 12.5 milligrams daily.  Continue with EP.     3) dyslipidemia: LDL most recently elevated however it appears that she has not been taking her statin.  This seems to be an oversight.  She does not believe that she had any side effects or adverse reactions with it.  I will renew the prescription for rosuvastatin 20 mg daily which has historically kept her LDL less than 55.     4) hypertension: Blood pressure well controlled.  Continue with her listed heart failure medications above.     5) follow-up: 6 months with Tracy Schwab or sooner as needed.    Malachi Walter MD

## 2025-05-12 ENCOUNTER — APPOINTMENT (OUTPATIENT)
Dept: CARDIOLOGY | Facility: CLINIC | Age: 80
End: 2025-05-12
Payer: MEDICARE

## 2025-05-30 ENCOUNTER — APPOINTMENT (OUTPATIENT)
Dept: CARDIOLOGY | Facility: CLINIC | Age: 80
End: 2025-05-30
Payer: MEDICARE

## 2025-06-16 ENCOUNTER — APPOINTMENT (OUTPATIENT)
Age: 80
End: 2025-06-16
Payer: MEDICARE

## 2025-06-19 ENCOUNTER — APPOINTMENT (OUTPATIENT)
Dept: PRIMARY CARE | Facility: CLINIC | Age: 80
End: 2025-06-19
Payer: MEDICARE

## 2025-06-19 VITALS
SYSTOLIC BLOOD PRESSURE: 112 MMHG | OXYGEN SATURATION: 98 % | BODY MASS INDEX: 25.34 KG/M2 | HEART RATE: 82 BPM | HEIGHT: 63 IN | RESPIRATION RATE: 14 BRPM | DIASTOLIC BLOOD PRESSURE: 74 MMHG | WEIGHT: 143 LBS

## 2025-06-19 DIAGNOSIS — J44.9 CHRONIC OBSTRUCTIVE PULMONARY DISEASE, UNSPECIFIED COPD TYPE (MULTI): ICD-10-CM

## 2025-06-19 DIAGNOSIS — F33.2 MAJOR DEPRESSIVE DISORDER, RECURRENT SEVERE WITHOUT PSYCHOTIC FEATURES (MULTI): ICD-10-CM

## 2025-06-19 DIAGNOSIS — K86.1 CHRONIC RECURRENT PANCREATITIS (MULTI): ICD-10-CM

## 2025-06-19 DIAGNOSIS — R39.15 URINARY URGENCY: Primary | ICD-10-CM

## 2025-06-19 DIAGNOSIS — D50.9 IRON DEFICIENCY ANEMIA, UNSPECIFIED IRON DEFICIENCY ANEMIA TYPE: ICD-10-CM

## 2025-06-19 PROCEDURE — 3078F DIAST BP <80 MM HG: CPT | Performed by: INTERNAL MEDICINE

## 2025-06-19 PROCEDURE — 3074F SYST BP LT 130 MM HG: CPT | Performed by: INTERNAL MEDICINE

## 2025-06-19 PROCEDURE — 1159F MED LIST DOCD IN RCRD: CPT | Performed by: INTERNAL MEDICINE

## 2025-06-19 PROCEDURE — 99214 OFFICE O/P EST MOD 30 MIN: CPT | Performed by: INTERNAL MEDICINE

## 2025-06-19 RX ORDER — TROSPIUM CHLORIDE 20 MG/1
20 TABLET, FILM COATED ORAL 2 TIMES DAILY
Qty: 60 TABLET | Refills: 11 | Status: SHIPPED | OUTPATIENT
Start: 2025-06-19 | End: 2026-06-19

## 2025-06-19 ASSESSMENT — PATIENT HEALTH QUESTIONNAIRE - PHQ9
SUM OF ALL RESPONSES TO PHQ9 QUESTIONS 1 AND 2: 0
1. LITTLE INTEREST OR PLEASURE IN DOING THINGS: NOT AT ALL
2. FEELING DOWN, DEPRESSED OR HOPELESS: NOT AT ALL

## 2025-06-19 ASSESSMENT — ENCOUNTER SYMPTOMS
FATIGUE: 1
DIARRHEA: 0
CHILLS: 0
SHORTNESS OF BREATH: 0
CONSTIPATION: 0
JOINT SWELLING: 0
COUGH: 0
FEVER: 0
DIAPHORESIS: 0
NAUSEA: 0
MYALGIAS: 0
VOMITING: 0

## 2025-06-19 NOTE — PATIENT INSTRUCTIONS
SINCE YOU HAVE ALREADY TRIED MYRBETRIQ WITHOUT RELIEF OF YOUR SYMPTOM, AND SINCE OXYBUTYNIN IS INEFFECTIVE, RECOMMEND A TRIAL OF TROSPIIUM NOW    2.  I AGREE WITH SEEING YOUR UROLOGIST, BUT IF NO NEW IDEAS ARE OFFERED, I CAN REFER YOU TO A UROLOGICAL-GYNECOLOGIST SPECIALIST TO GET A SECOND OPINION    3.  PLEASE CALL IF OTHER REFILLS ARE NEEDED    4.  THE MILD CHRONIC ANEMIA THAT I'VE BEEN MONITORING INCREASINGLY SEEMS TO BE DUE TO SOME DEGREE OF IRON DEFICIENCY - EVEN THOUGH YOU TAKE IRON, I BELIEVE THE PANCREATIC INSUFFICIENCY FROM CHRONIC PANCREATITIS MAY LIMIT YOUR ABILITY TO ABSORB IRON FROM FOOD OR PILLS.    5.  IF IRON DEFICIENCY IS CONFIRMED , THEN WILL CONSIDER GIVING YOU A COURSE IF INTRAVENOUS IRON REPLACEMENT TO TRY TO HELP YOU BUILD YOUR BLOOD COUNT UP A LITTLE MORE - COULD GIVE YOU MORE ENERGY    6.  FOLLOW UP AS SCHEDULED OR AS NEEDED

## 2025-06-19 NOTE — PROGRESS NOTES
"Subjective   Gianna Parker is a 79 y.o. female who presents for  FOLLOW UP   INCREASED URINARY INCONTINENCE WEARS A DEPENDS TAKES OXYBUTIN    HAD CCM IMPLANT PLACED 3WKS AGO   HPI   STILL WITH URINARY TROUBLE    HAS URGENCY INCONTINENCE    HAD IT FOR AWHILE    ON OXYBUTYNIN, NOT HELPING    THE NEW CARDIAC IMPLANT SEEMS TO BE WORKING WELL, HAVE NOTICED AN IMPROVED ABILITY TO DO WORKLOADS SINCE PLACEMENT      Review of Systems   Constitutional:  Positive for fatigue. Negative for chills, diaphoresis and fever.   Respiratory:  Negative for cough and shortness of breath.    Cardiovascular:  Negative for chest pain and leg swelling.   Gastrointestinal:  Negative for constipation, diarrhea, nausea and vomiting.   Genitourinary:  Positive for urgency.   Musculoskeletal:  Negative for joint swelling and myalgias.       Objective   /74   Pulse 82   Resp 14   Ht 1.6 m (5' 3\")   Wt 64.9 kg (143 lb)   SpO2 98%   BMI 25.33 kg/m²     Physical Exam  Vitals reviewed.   Constitutional:       General: She is not in acute distress.     Appearance: She is not ill-appearing.   Cardiovascular:      Rate and Rhythm: Normal rate and regular rhythm.      Pulses: Normal pulses.      Heart sounds:      No gallop.   Pulmonary:      Breath sounds: Normal breath sounds. No wheezing, rhonchi or rales.   Abdominal:      General: Abdomen is flat. Bowel sounds are normal.      Palpations: Abdomen is soft.      Tenderness: There is no guarding or rebound.   Musculoskeletal:      Right lower leg: No edema.      Left lower leg: No edema.         Assessment/Plan   Problem List Items Addressed This Visit       Anemia    Relevant Orders    CBC    Ferritin    Iron and TIBC    Urinary urgency - Primary    Relevant Medications    trospium (Sanctura) 20 mg tablet    Chronic recurrent pancreatitis (Multi)    MONITORED BY GI         Chronic obstructive pulmonary disease, unspecified COPD type (Multi)    CURRENTLY CLINICALLY QUIESCENT         Major " depressive disorder, recurrent severe without psychotic features (Multi)    FOLLOWED BY PSYCHIATRY          Patient Instructions   SINCE YOU HAVE ALREADY TRIED MYRBETRIQ WITHOUT RELIEF OF YOUR SYMPTOM, AND SINCE OXYBUTYNIN IS INEFFECTIVE, RECOMMEND A TRIAL OF TROSPIIUM NOW    2.  I AGREE WITH SEEING YOUR UROLOGIST, BUT IF NO NEW IDEAS ARE OFFERED, I CAN REFER YOU TO A UROLOGICAL-GYNECOLOGIST SPECIALIST TO GET A SECOND OPINION    3.  PLEASE CALL IF OTHER REFILLS ARE NEEDED    4.  THE MILD CHRONIC ANEMIA THAT I'VE BEEN MONITORING INCREASINGLY SEEMS TO BE DUE TO SOME DEGREE OF IRON DEFICIENCY - EVEN THOUGH YOU TAKE IRON, I BELIEVE THE PANCREATIC INSUFFICIENCY FROM CHRONIC PANCREATITIS MAY LIMIT YOUR ABILITY TO ABSORB IRON FROM FOOD OR PILLS.    5.  IF IRON DEFICIENCY IS CONFIRMED , THEN WILL CONSIDER GIVING YOU A COURSE IF INTRAVENOUS IRON REPLACEMENT TO TRY TO HELP YOU BUILD YOUR BLOOD COUNT UP A LITTLE MORE - COULD GIVE YOU MORE ENERGY    6.  FOLLOW UP AS SCHEDULED OR AS NEEDED

## 2025-06-25 DIAGNOSIS — K21.9 GASTROESOPHAGEAL REFLUX DISEASE, UNSPECIFIED WHETHER ESOPHAGITIS PRESENT: ICD-10-CM

## 2025-06-25 RX ORDER — OMEPRAZOLE 40 MG/1
40 CAPSULE, DELAYED RELEASE ORAL 2 TIMES DAILY
Qty: 180 CAPSULE | Refills: 3 | Status: SHIPPED | OUTPATIENT
Start: 2025-06-25

## 2025-06-26 ENCOUNTER — TELEPHONE (OUTPATIENT)
Dept: PRIMARY CARE | Facility: CLINIC | Age: 80
End: 2025-06-26
Payer: MEDICARE

## 2025-06-26 DIAGNOSIS — R39.15 URINARY URGENCY: ICD-10-CM

## 2025-06-26 RX ORDER — OXYBUTYNIN CHLORIDE 15 MG/1
15 TABLET, EXTENDED RELEASE ORAL DAILY
Qty: 90 TABLET | Refills: 3 | Status: SHIPPED | OUTPATIENT
Start: 2025-06-26 | End: 2026-06-26

## 2025-06-26 NOTE — TELEPHONE ENCOUNTER
Pt advised the new urinary urgency you prescribed trospium (Sanctura) 20 mg for her on 6/19 is not working.    She would like to go back on the oxybutynin XL (Ditropan-XL) 15 mg 24 hr tablet  and asking if you would send in a new order to [facility] [address]    ** PT IS LEAVING FOR A TRIP THIS EVENING.  ASKING FOR THIS TO BE EXPEDITED.

## 2025-07-11 LAB
ERYTHROCYTE [DISTWIDTH] IN BLOOD BY AUTOMATED COUNT: 18.1 % (ref 11–15)
FERRITIN SERPL-MCNC: 24 NG/ML (ref 16–288)
HCT VFR BLD AUTO: 37.4 % (ref 35–45)
HGB BLD-MCNC: 10.9 G/DL (ref 11.7–15.5)
IRON SATN MFR SERPL: 33 % (CALC) (ref 16–45)
IRON SERPL-MCNC: 102 MCG/DL (ref 45–160)
MCH RBC QN AUTO: 24.9 PG (ref 27–33)
MCHC RBC AUTO-ENTMCNC: 29.1 G/DL (ref 32–36)
MCV RBC AUTO: 85.6 FL (ref 80–100)
PLATELET # BLD AUTO: 333 THOUSAND/UL (ref 140–400)
PMV BLD REES-ECKER: 10.8 FL (ref 7.5–12.5)
RBC # BLD AUTO: 4.37 MILLION/UL (ref 3.8–5.1)
TIBC SERPL-MCNC: 312 MCG/DL (CALC) (ref 250–450)
WBC # BLD AUTO: 8.5 THOUSAND/UL (ref 3.8–10.8)

## 2025-07-15 ENCOUNTER — APPOINTMENT (OUTPATIENT)
Dept: CARDIOLOGY | Facility: CLINIC | Age: 80
End: 2025-07-15
Payer: MEDICARE

## 2025-07-20 DIAGNOSIS — I50.22 CHRONIC SYSTOLIC (CONGESTIVE) HEART FAILURE: ICD-10-CM

## 2025-07-21 ENCOUNTER — APPOINTMENT (OUTPATIENT)
Dept: CARDIOLOGY | Facility: CLINIC | Age: 80
End: 2025-07-21
Payer: MEDICARE

## 2025-07-21 RX ORDER — SPIRONOLACTONE 25 MG/1
12.5 TABLET ORAL DAILY
Qty: 45 TABLET | Refills: 3 | Status: SHIPPED | OUTPATIENT
Start: 2025-07-21

## 2025-08-20 DIAGNOSIS — N39.46 URGE AND STRESS INCONTINENCE: Primary | ICD-10-CM

## 2025-08-22 ENCOUNTER — APPOINTMENT (OUTPATIENT)
Dept: CARDIOLOGY | Facility: CLINIC | Age: 80
End: 2025-08-22
Payer: MEDICARE

## 2025-09-02 ENCOUNTER — APPOINTMENT (OUTPATIENT)
Dept: CARDIOLOGY | Facility: CLINIC | Age: 80
End: 2025-09-02
Payer: MEDICARE

## 2025-09-02 ASSESSMENT — ENCOUNTER SYMPTOMS
PALPITATIONS: 0
DYSPNEA ON EXERTION: 0

## 2025-10-21 ENCOUNTER — APPOINTMENT (OUTPATIENT)
Dept: PRIMARY CARE | Facility: CLINIC | Age: 80
End: 2025-10-21
Payer: MEDICARE

## 2025-10-30 ENCOUNTER — APPOINTMENT (OUTPATIENT)
Dept: CARDIOLOGY | Facility: CLINIC | Age: 80
End: 2025-10-30
Payer: MEDICARE

## 2026-09-01 ENCOUNTER — APPOINTMENT (OUTPATIENT)
Dept: CARDIOLOGY | Facility: CLINIC | Age: 81
End: 2026-09-01
Payer: MEDICARE

## (undated) DEVICE — DILATOR, VESSEL, 8 FR

## (undated) DEVICE — INTRODUCER, HYDROPHILIC, PRELUDE SNAP, 6 FR X 13 CM, GREEN

## (undated) DEVICE — WOUND SYSTEM, DEBRIDEMENT & CLEANING, O.R DUOPAK

## (undated) DEVICE — KIT, STYLET, 58CM

## (undated) DEVICE — RETRACTOR, CORDLESS, RADIALUX, LIGHTED

## (undated) DEVICE — ACCESS KIT, MINI MAK, 5FR X 10CM, 0.018 X 40CM, SS/SS, STANDARD NEEDLE

## (undated) DEVICE — SHIELD, RADPAD, ELECTROPHYSIOLOGY, ORANGE, ABSORBENT

## (undated) DEVICE — Device

## (undated) DEVICE — HEMOSTAT, D-STAT FLOWABLE

## (undated) DEVICE — DRESSING, AQUACEL AG, HYDROFIBER W/SILVER, 3.5 X 6 IN

## (undated) DEVICE — HEMOSTAT, ARISTA, ABSORBABLE, 1 GRAMS

## (undated) DEVICE — GUIDEWIRE, ANGLE TIP,  .035 DIA, 180 CM, 3 CM TIP"